# Patient Record
Sex: FEMALE | Race: WHITE | NOT HISPANIC OR LATINO | Employment: FULL TIME | URBAN - METROPOLITAN AREA
[De-identification: names, ages, dates, MRNs, and addresses within clinical notes are randomized per-mention and may not be internally consistent; named-entity substitution may affect disease eponyms.]

---

## 2021-03-29 ENCOUNTER — TELEPHONE (OUTPATIENT)
Dept: NEUROLOGY | Facility: CLINIC | Age: 26
End: 2021-03-29

## 2021-07-07 ENCOUNTER — OFFICE VISIT (OUTPATIENT)
Dept: NEUROLOGY | Facility: CLINIC | Age: 26
End: 2021-07-07
Payer: COMMERCIAL

## 2021-07-07 VITALS
WEIGHT: 198.88 LBS | HEART RATE: 77 BPM | DIASTOLIC BLOOD PRESSURE: 77 MMHG | SYSTOLIC BLOOD PRESSURE: 113 MMHG | BODY MASS INDEX: 33.95 KG/M2 | HEIGHT: 64 IN

## 2021-07-07 DIAGNOSIS — G43.119 INTRACTABLE MIGRAINE WITH AURA WITHOUT STATUS MIGRAINOSUS: Primary | ICD-10-CM

## 2021-07-07 PROCEDURE — 99204 OFFICE O/P NEW MOD 45 MIN: CPT | Mod: S$GLB,,, | Performed by: NEUROLOGICAL SURGERY

## 2021-07-07 PROCEDURE — 3008F PR BODY MASS INDEX (BMI) DOCUMENTED: ICD-10-PCS | Mod: CPTII,S$GLB,, | Performed by: NEUROLOGICAL SURGERY

## 2021-07-07 PROCEDURE — 99204 PR OFFICE/OUTPT VISIT, NEW, LEVL IV, 45-59 MIN: ICD-10-PCS | Mod: S$GLB,,, | Performed by: NEUROLOGICAL SURGERY

## 2021-07-07 PROCEDURE — 99999 PR PBB SHADOW E&M-EST. PATIENT-LVL III: ICD-10-PCS | Mod: PBBFAC,,, | Performed by: NEUROLOGICAL SURGERY

## 2021-07-07 PROCEDURE — 1126F PR PAIN SEVERITY QUANTIFIED, NO PAIN PRESENT: ICD-10-PCS | Mod: S$GLB,,, | Performed by: NEUROLOGICAL SURGERY

## 2021-07-07 PROCEDURE — 99999 PR PBB SHADOW E&M-EST. PATIENT-LVL III: CPT | Mod: PBBFAC,,, | Performed by: NEUROLOGICAL SURGERY

## 2021-07-07 PROCEDURE — 3008F BODY MASS INDEX DOCD: CPT | Mod: CPTII,S$GLB,, | Performed by: NEUROLOGICAL SURGERY

## 2021-07-07 PROCEDURE — 1126F AMNT PAIN NOTED NONE PRSNT: CPT | Mod: S$GLB,,, | Performed by: NEUROLOGICAL SURGERY

## 2021-07-07 RX ORDER — GABAPENTIN 300 MG/1
600 CAPSULE ORAL 3 TIMES DAILY
Qty: 180 CAPSULE | Refills: 11 | Status: SHIPPED | OUTPATIENT
Start: 2021-07-07 | End: 2022-07-07

## 2021-07-07 RX ORDER — ERENUMAB-AOOE 70 MG/ML
70 INJECTION SUBCUTANEOUS
Qty: 1 ML | Refills: 5 | Status: SHIPPED | OUTPATIENT
Start: 2021-07-07 | End: 2021-07-30

## 2021-07-07 RX ORDER — NORTRIPTYLINE HYDROCHLORIDE 50 MG/1
100 CAPSULE ORAL NIGHTLY
Qty: 60 CAPSULE | Refills: 5 | Status: SHIPPED | OUTPATIENT
Start: 2021-07-07 | End: 2021-12-09 | Stop reason: ALTCHOICE

## 2021-07-07 RX ORDER — NORTRIPTYLINE HYDROCHLORIDE 75 MG/1
75 CAPSULE ORAL DAILY
COMMUNITY
Start: 2021-03-26 | End: 2021-07-07

## 2021-07-07 RX ORDER — ACETAMINOPHEN AND CODEINE PHOSPHATE 120; 12 MG/5ML; MG/5ML
SOLUTION ORAL
COMMUNITY
Start: 2021-03-26

## 2021-07-07 RX ORDER — GABAPENTIN 300 MG/1
600 CAPSULE ORAL 3 TIMES DAILY
COMMUNITY
Start: 2021-05-30 | End: 2021-07-07 | Stop reason: SDUPTHER

## 2021-08-05 ENCOUNTER — TELEPHONE (OUTPATIENT)
Dept: NEUROLOGY | Facility: CLINIC | Age: 26
End: 2021-08-05

## 2021-09-01 ENCOUNTER — NURSE TRIAGE (OUTPATIENT)
Dept: ADMINISTRATIVE | Facility: CLINIC | Age: 26
End: 2021-09-01

## 2021-09-01 ENCOUNTER — TELEPHONE (OUTPATIENT)
Dept: NEUROLOGY | Facility: HOSPITAL | Age: 26
End: 2021-09-01

## 2021-09-27 ENCOUNTER — OFFICE VISIT (OUTPATIENT)
Dept: URGENT CARE | Facility: CLINIC | Age: 26
End: 2021-09-27
Payer: COMMERCIAL

## 2021-09-27 VITALS
TEMPERATURE: 98 F | BODY MASS INDEX: 32.27 KG/M2 | WEIGHT: 189 LBS | OXYGEN SATURATION: 100 % | SYSTOLIC BLOOD PRESSURE: 121 MMHG | RESPIRATION RATE: 14 BRPM | HEIGHT: 64 IN | HEART RATE: 61 BPM | DIASTOLIC BLOOD PRESSURE: 78 MMHG

## 2021-09-27 DIAGNOSIS — N39.0 URINARY TRACT INFECTION WITHOUT HEMATURIA, SITE UNSPECIFIED: Primary | ICD-10-CM

## 2021-09-27 LAB
B-HCG UR QL: NEGATIVE
BILIRUB UR QL STRIP: NEGATIVE
CTP QC/QA: YES
GLUCOSE UR QL STRIP: NEGATIVE
KETONES UR QL STRIP: NEGATIVE
LEUKOCYTE ESTERASE UR QL STRIP: POSITIVE
PH, POC UA: 5 (ref 5–8)
POC BLOOD, URINE: NEGATIVE
POC NITRATES, URINE: NEGATIVE
PROT UR QL STRIP: NEGATIVE
SP GR UR STRIP: 1.01 (ref 1–1.03)
UROBILINOGEN UR STRIP-ACNC: NORMAL (ref 0.1–1.1)

## 2021-09-27 PROCEDURE — 81003 URINALYSIS AUTO W/O SCOPE: CPT | Mod: QW,S$GLB,, | Performed by: PHYSICIAN ASSISTANT

## 2021-09-27 PROCEDURE — 3078F PR MOST RECENT DIASTOLIC BLOOD PRESSURE < 80 MM HG: ICD-10-PCS | Mod: CPTII,S$GLB,, | Performed by: PHYSICIAN ASSISTANT

## 2021-09-27 PROCEDURE — 1160F PR REVIEW ALL MEDS BY PRESCRIBER/CLIN PHARMACIST DOCUMENTED: ICD-10-PCS | Mod: CPTII,S$GLB,, | Performed by: PHYSICIAN ASSISTANT

## 2021-09-27 PROCEDURE — 99204 PR OFFICE/OUTPT VISIT, NEW, LEVL IV, 45-59 MIN: ICD-10-PCS | Mod: 25,S$GLB,, | Performed by: PHYSICIAN ASSISTANT

## 2021-09-27 PROCEDURE — 99204 OFFICE O/P NEW MOD 45 MIN: CPT | Mod: 25,S$GLB,, | Performed by: PHYSICIAN ASSISTANT

## 2021-09-27 PROCEDURE — 1159F PR MEDICATION LIST DOCUMENTED IN MEDICAL RECORD: ICD-10-PCS | Mod: CPTII,S$GLB,, | Performed by: PHYSICIAN ASSISTANT

## 2021-09-27 PROCEDURE — 3074F SYST BP LT 130 MM HG: CPT | Mod: CPTII,S$GLB,, | Performed by: PHYSICIAN ASSISTANT

## 2021-09-27 PROCEDURE — 3078F DIAST BP <80 MM HG: CPT | Mod: CPTII,S$GLB,, | Performed by: PHYSICIAN ASSISTANT

## 2021-09-27 PROCEDURE — 3074F PR MOST RECENT SYSTOLIC BLOOD PRESSURE < 130 MM HG: ICD-10-PCS | Mod: CPTII,S$GLB,, | Performed by: PHYSICIAN ASSISTANT

## 2021-09-27 PROCEDURE — 3008F BODY MASS INDEX DOCD: CPT | Mod: CPTII,S$GLB,, | Performed by: PHYSICIAN ASSISTANT

## 2021-09-27 PROCEDURE — 81025 URINE PREGNANCY TEST: CPT | Mod: S$GLB,,, | Performed by: PHYSICIAN ASSISTANT

## 2021-09-27 PROCEDURE — 1159F MED LIST DOCD IN RCRD: CPT | Mod: CPTII,S$GLB,, | Performed by: PHYSICIAN ASSISTANT

## 2021-09-27 PROCEDURE — 1160F RVW MEDS BY RX/DR IN RCRD: CPT | Mod: CPTII,S$GLB,, | Performed by: PHYSICIAN ASSISTANT

## 2021-09-27 PROCEDURE — 81025 POCT URINE PREGNANCY: ICD-10-PCS | Mod: S$GLB,,, | Performed by: PHYSICIAN ASSISTANT

## 2021-09-27 PROCEDURE — 3008F PR BODY MASS INDEX (BMI) DOCUMENTED: ICD-10-PCS | Mod: CPTII,S$GLB,, | Performed by: PHYSICIAN ASSISTANT

## 2021-09-27 PROCEDURE — 81003 POCT URINALYSIS, DIPSTICK, AUTOMATED, W/O SCOPE: ICD-10-PCS | Mod: QW,S$GLB,, | Performed by: PHYSICIAN ASSISTANT

## 2021-09-27 RX ORDER — SULFAMETHOXAZOLE AND TRIMETHOPRIM 800; 160 MG/1; MG/1
1 TABLET ORAL 2 TIMES DAILY
Qty: 10 TABLET | Refills: 0 | Status: SHIPPED | OUTPATIENT
Start: 2021-09-27 | End: 2021-10-02

## 2021-09-27 RX ORDER — PHENAZOPYRIDINE HYDROCHLORIDE 100 MG/1
100 TABLET, FILM COATED ORAL 3 TIMES DAILY PRN
Qty: 9 TABLET | Refills: 0 | Status: SHIPPED | OUTPATIENT
Start: 2021-09-27 | End: 2021-09-30

## 2021-10-14 ENCOUNTER — OFFICE VISIT (OUTPATIENT)
Dept: NEUROLOGY | Facility: CLINIC | Age: 26
End: 2021-10-14
Payer: COMMERCIAL

## 2021-10-14 ENCOUNTER — NURSE TRIAGE (OUTPATIENT)
Dept: ADMINISTRATIVE | Facility: CLINIC | Age: 26
End: 2021-10-14

## 2021-10-14 ENCOUNTER — TELEPHONE (OUTPATIENT)
Dept: NEUROLOGY | Facility: CLINIC | Age: 26
End: 2021-10-14

## 2021-10-14 VITALS
SYSTOLIC BLOOD PRESSURE: 124 MMHG | HEART RATE: 84 BPM | BODY MASS INDEX: 34.44 KG/M2 | WEIGHT: 201.75 LBS | DIASTOLIC BLOOD PRESSURE: 78 MMHG | HEIGHT: 64 IN

## 2021-10-14 DIAGNOSIS — G43.119 INTRACTABLE MIGRAINE WITH AURA WITHOUT STATUS MIGRAINOSUS: Primary | ICD-10-CM

## 2021-10-14 PROCEDURE — 99999 PR PBB SHADOW E&M-EST. PATIENT-LVL III: CPT | Mod: PBBFAC,,, | Performed by: NEUROLOGICAL SURGERY

## 2021-10-14 PROCEDURE — 3078F DIAST BP <80 MM HG: CPT | Mod: CPTII,S$GLB,, | Performed by: NEUROLOGICAL SURGERY

## 2021-10-14 PROCEDURE — 3078F PR MOST RECENT DIASTOLIC BLOOD PRESSURE < 80 MM HG: ICD-10-PCS | Mod: CPTII,S$GLB,, | Performed by: NEUROLOGICAL SURGERY

## 2021-10-14 PROCEDURE — 3074F PR MOST RECENT SYSTOLIC BLOOD PRESSURE < 130 MM HG: ICD-10-PCS | Mod: CPTII,S$GLB,, | Performed by: NEUROLOGICAL SURGERY

## 2021-10-14 PROCEDURE — 3008F PR BODY MASS INDEX (BMI) DOCUMENTED: ICD-10-PCS | Mod: CPTII,S$GLB,, | Performed by: NEUROLOGICAL SURGERY

## 2021-10-14 PROCEDURE — 99213 PR OFFICE/OUTPT VISIT, EST, LEVL III, 20-29 MIN: ICD-10-PCS | Mod: S$GLB,,, | Performed by: NEUROLOGICAL SURGERY

## 2021-10-14 PROCEDURE — 3008F BODY MASS INDEX DOCD: CPT | Mod: CPTII,S$GLB,, | Performed by: NEUROLOGICAL SURGERY

## 2021-10-14 PROCEDURE — 99999 PR PBB SHADOW E&M-EST. PATIENT-LVL III: ICD-10-PCS | Mod: PBBFAC,,, | Performed by: NEUROLOGICAL SURGERY

## 2021-10-14 PROCEDURE — 1159F PR MEDICATION LIST DOCUMENTED IN MEDICAL RECORD: ICD-10-PCS | Mod: CPTII,S$GLB,, | Performed by: NEUROLOGICAL SURGERY

## 2021-10-14 PROCEDURE — 3074F SYST BP LT 130 MM HG: CPT | Mod: CPTII,S$GLB,, | Performed by: NEUROLOGICAL SURGERY

## 2021-10-14 PROCEDURE — 99213 OFFICE O/P EST LOW 20 MIN: CPT | Mod: S$GLB,,, | Performed by: NEUROLOGICAL SURGERY

## 2021-10-14 PROCEDURE — 1159F MED LIST DOCD IN RCRD: CPT | Mod: CPTII,S$GLB,, | Performed by: NEUROLOGICAL SURGERY

## 2021-11-22 ENCOUNTER — OFFICE VISIT (OUTPATIENT)
Dept: PSYCHIATRY | Facility: CLINIC | Age: 26
End: 2021-11-22
Payer: COMMERCIAL

## 2021-11-22 DIAGNOSIS — F43.23 ADJUSTMENT DISORDER WITH MIXED ANXIETY AND DEPRESSED MOOD: Primary | ICD-10-CM

## 2021-11-22 PROCEDURE — 90791 PR PSYCHIATRIC DIAGNOSTIC EVALUATION: ICD-10-PCS | Mod: S$GLB,,, | Performed by: SOCIAL WORKER

## 2021-11-22 PROCEDURE — 90791 PSYCH DIAGNOSTIC EVALUATION: CPT | Mod: S$GLB,,, | Performed by: SOCIAL WORKER

## 2021-11-30 ENCOUNTER — OFFICE VISIT (OUTPATIENT)
Dept: PSYCHIATRY | Facility: CLINIC | Age: 26
End: 2021-11-30
Payer: COMMERCIAL

## 2021-11-30 DIAGNOSIS — F32.1 CURRENT MODERATE EPISODE OF MAJOR DEPRESSIVE DISORDER, UNSPECIFIED WHETHER RECURRENT: Primary | ICD-10-CM

## 2021-11-30 PROCEDURE — 90834 PR PSYCHOTHERAPY W/PATIENT, 45 MIN: ICD-10-PCS | Mod: S$GLB,,, | Performed by: SOCIAL WORKER

## 2021-11-30 PROCEDURE — 90834 PSYTX W PT 45 MINUTES: CPT | Mod: S$GLB,,, | Performed by: SOCIAL WORKER

## 2021-12-09 ENCOUNTER — OFFICE VISIT (OUTPATIENT)
Dept: PSYCHIATRY | Facility: CLINIC | Age: 26
End: 2021-12-09
Payer: COMMERCIAL

## 2021-12-09 VITALS
WEIGHT: 204.06 LBS | BODY MASS INDEX: 34.84 KG/M2 | HEIGHT: 64 IN | DIASTOLIC BLOOD PRESSURE: 70 MMHG | HEART RATE: 80 BPM | SYSTOLIC BLOOD PRESSURE: 133 MMHG

## 2021-12-09 DIAGNOSIS — F41.1 GENERALIZED ANXIETY DISORDER: ICD-10-CM

## 2021-12-09 DIAGNOSIS — F32.1 CURRENT MODERATE EPISODE OF MAJOR DEPRESSIVE DISORDER, UNSPECIFIED WHETHER RECURRENT: Primary | ICD-10-CM

## 2021-12-09 DIAGNOSIS — F33.2 SEVERE EPISODE OF RECURRENT MAJOR DEPRESSIVE DISORDER, WITHOUT PSYCHOTIC FEATURES: Primary | ICD-10-CM

## 2021-12-09 PROCEDURE — 90834 PSYTX W PT 45 MINUTES: CPT | Mod: S$GLB,,, | Performed by: SOCIAL WORKER

## 2021-12-09 PROCEDURE — 99999 PR PBB SHADOW E&M-EST. PATIENT-LVL II: CPT | Mod: PBBFAC,,, | Performed by: STUDENT IN AN ORGANIZED HEALTH CARE EDUCATION/TRAINING PROGRAM

## 2021-12-09 PROCEDURE — 90834 PR PSYCHOTHERAPY W/PATIENT, 45 MIN: ICD-10-PCS | Mod: S$GLB,,, | Performed by: SOCIAL WORKER

## 2021-12-09 PROCEDURE — 99999 PR PBB SHADOW E&M-EST. PATIENT-LVL II: ICD-10-PCS | Mod: PBBFAC,,, | Performed by: STUDENT IN AN ORGANIZED HEALTH CARE EDUCATION/TRAINING PROGRAM

## 2021-12-09 PROCEDURE — 99205 PR OFFICE/OUTPT VISIT, NEW, LEVL V, 60-74 MIN: ICD-10-PCS | Mod: S$GLB,,, | Performed by: STUDENT IN AN ORGANIZED HEALTH CARE EDUCATION/TRAINING PROGRAM

## 2021-12-09 PROCEDURE — 99205 OFFICE O/P NEW HI 60 MIN: CPT | Mod: S$GLB,,, | Performed by: STUDENT IN AN ORGANIZED HEALTH CARE EDUCATION/TRAINING PROGRAM

## 2021-12-09 RX ORDER — ESCITALOPRAM OXALATE 10 MG/1
10 TABLET ORAL DAILY
Qty: 30 TABLET | Refills: 0 | Status: SHIPPED | OUTPATIENT
Start: 2021-12-09 | End: 2022-01-06 | Stop reason: SDUPTHER

## 2021-12-10 PROBLEM — F32.A DEPRESSION: Status: ACTIVE | Noted: 2021-12-10

## 2021-12-22 ENCOUNTER — OFFICE VISIT (OUTPATIENT)
Dept: PSYCHIATRY | Facility: CLINIC | Age: 26
End: 2021-12-22
Payer: COMMERCIAL

## 2021-12-22 DIAGNOSIS — F32.1 CURRENT MODERATE EPISODE OF MAJOR DEPRESSIVE DISORDER, UNSPECIFIED WHETHER RECURRENT: Primary | ICD-10-CM

## 2021-12-22 PROCEDURE — 90834 PR PSYCHOTHERAPY W/PATIENT, 45 MIN: ICD-10-PCS | Mod: S$GLB,,, | Performed by: SOCIAL WORKER

## 2021-12-22 PROCEDURE — 90834 PSYTX W PT 45 MINUTES: CPT | Mod: S$GLB,,, | Performed by: SOCIAL WORKER

## 2021-12-28 ENCOUNTER — PATIENT MESSAGE (OUTPATIENT)
Dept: PSYCHIATRY | Facility: CLINIC | Age: 26
End: 2021-12-28
Payer: COMMERCIAL

## 2021-12-28 ENCOUNTER — OFFICE VISIT (OUTPATIENT)
Dept: PSYCHIATRY | Facility: CLINIC | Age: 26
End: 2021-12-28
Payer: COMMERCIAL

## 2021-12-28 DIAGNOSIS — F32.1 CURRENT MODERATE EPISODE OF MAJOR DEPRESSIVE DISORDER, UNSPECIFIED WHETHER RECURRENT: Primary | ICD-10-CM

## 2021-12-28 PROCEDURE — 90834 PSYTX W PT 45 MINUTES: CPT | Mod: 95,,, | Performed by: SOCIAL WORKER

## 2021-12-28 PROCEDURE — 90834 PR PSYCHOTHERAPY W/PATIENT, 45 MIN: ICD-10-PCS | Mod: 95,,, | Performed by: SOCIAL WORKER

## 2022-01-04 ENCOUNTER — OFFICE VISIT (OUTPATIENT)
Dept: PSYCHIATRY | Facility: CLINIC | Age: 27
End: 2022-01-04
Payer: COMMERCIAL

## 2022-01-04 DIAGNOSIS — F32.1 CURRENT MODERATE EPISODE OF MAJOR DEPRESSIVE DISORDER, UNSPECIFIED WHETHER RECURRENT: Primary | ICD-10-CM

## 2022-01-04 PROCEDURE — 90834 PR PSYCHOTHERAPY W/PATIENT, 45 MIN: ICD-10-PCS | Mod: 95,,, | Performed by: SOCIAL WORKER

## 2022-01-04 PROCEDURE — 90834 PSYTX W PT 45 MINUTES: CPT | Mod: 95,,, | Performed by: SOCIAL WORKER

## 2022-01-04 NOTE — PROGRESS NOTES
"Individual Psychotherapy (PhD/LCSW)    1/4/2022    The patient location is: [place]  The chief complaint leading to consultation is: depression and anxiety    Visit type: audiovisual    Face to Face time with patient: 45 minutes  60 minutes of total time spent on the encounter, which includes face to face time and non-face to face time preparing to see the patient (eg, review of tests), Obtaining and/or reviewing separately obtained history, Documenting clinical information in the electronic or other health record, Independently interpreting results (not separately reported) and communicating results to the patient/family/caregiver, or Care coordination (not separately reported).         Each patient to whom he or she provides medical services by telemedicine is:  (1) informed of the relationship between the physician and patient and the respective role of any other health care provider with respect to management of the patient; and (2) notified that he or she may decline to receive medical services by telemedicine and may withdraw from such care at any time.    Site:  [facility]         Therapeutic Intervention: Met with patient.  Outpatient - Insight oriented psychotherapy 45 min - CPT code 65281, Outpatient - Behavior modifying psychotherapy 45 min - CPT code 31036 and Outpatient - Supportive psychotherapy 45 min - CPT Code 84161    Chief complaint/reason for encounter: depression     Interval history and content of current session: Pt is a 26 year old single white female who presents today with continued symptoms of depression in the context of multiple stressors related to work, school, and social life.     Pt presents today from her apartment. Pt appears AAOx4, pleasant but tired. Pt continues to report that she feels "exhausted" and states that she is unsure the Lexapro has helped with her mood. Pt plans on addressing in her psychiatry appt which will occur on 1/6. Pt reports that she was able to " attend her [employer] work out sessions and states that she does notice a little difference in mood after she completes the appointments. Pt reports that she was able to get out with friends this past Thurs and Fri but slept poorly last night. Pt focused rest of session on her relationship with her long-distance boyfriend Geovanny stating that she wants to communicate her needs but is fearful that he may not want to be in the relationship if she says anything. Pt reports that she and Geovanny were best friends for a few years before dating. Pt states that she was in one other serious relationship in 2019 for 6 months with a man from [place] while pt was in [employer] in [facility]. Pt reports that at the 6 month luis enrique, she asked if they could clarify the definition of their relationship, and pt states that after the conversation, she never heard from him again. Pt fearful of rejection this time as well. Clinician provided support around pt's fear and utilized reality testing (just because it happens in one relationship doesn't dictate that it will happen in the next relationship). Pt reports that she plans on having a conversation with boyfriend when he visits in a few weeks. Pt to focus on self care this week. Clinician gave patient homework assignment to visit [employer] [facility] one day this week.     Treatment plan:  · Target symptoms: depression, anxiety , work stress  · Why chosen therapy is appropriate versus another modality: relevant to diagnosis, patient responds to this modality, evidence based practice  · Outcome monitoring methods: self-report, observation  · Therapeutic intervention type: insight oriented psychotherapy, behavior modifying psychotherapy, supportive psychotherapy    Risk parameters:  Patient reports suicidal ideation: passive SI, denies active plan  Patient reports no homicidal ideation  Patient reports no self-injurious behavior  Patient reports no violent behavior    Verbal  deficits: None    Patient's response to intervention:  The patient's response to intervention is accepting.    Progress toward goals and other mental status changes:  The patient's progress toward goals is excellent.    Diagnosis:     ICD-10-CM ICD-9-CM   1. Current moderate episode of major depressive disorder, unspecified whether recurrent  F32.1 296.22       Plan:  individual psychotherapy and consult psychiatrist for medication evaluation    Return to clinic: 1 week    Length of Service (minutes): 45

## 2022-01-06 ENCOUNTER — OFFICE VISIT (OUTPATIENT)
Dept: PSYCHIATRY | Facility: CLINIC | Age: 27
End: 2022-01-06
Payer: COMMERCIAL

## 2022-01-06 VITALS
WEIGHT: 207.44 LBS | HEART RATE: 59 BPM | BODY MASS INDEX: 35.61 KG/M2 | DIASTOLIC BLOOD PRESSURE: 78 MMHG | SYSTOLIC BLOOD PRESSURE: 125 MMHG

## 2022-01-06 DIAGNOSIS — F33.2 SEVERE EPISODE OF RECURRENT MAJOR DEPRESSIVE DISORDER, WITHOUT PSYCHOTIC FEATURES: Primary | ICD-10-CM

## 2022-01-06 DIAGNOSIS — F41.1 GENERALIZED ANXIETY DISORDER: ICD-10-CM

## 2022-01-06 PROCEDURE — 99999 PR PBB SHADOW E&M-EST. PATIENT-LVL II: CPT | Mod: PBBFAC,,, | Performed by: STUDENT IN AN ORGANIZED HEALTH CARE EDUCATION/TRAINING PROGRAM

## 2022-01-06 PROCEDURE — 99214 PR OFFICE/OUTPT VISIT, EST, LEVL IV, 30-39 MIN: ICD-10-PCS | Mod: S$GLB,,, | Performed by: STUDENT IN AN ORGANIZED HEALTH CARE EDUCATION/TRAINING PROGRAM

## 2022-01-06 PROCEDURE — 99214 OFFICE O/P EST MOD 30 MIN: CPT | Mod: S$GLB,,, | Performed by: STUDENT IN AN ORGANIZED HEALTH CARE EDUCATION/TRAINING PROGRAM

## 2022-01-06 PROCEDURE — 99999 PR PBB SHADOW E&M-EST. PATIENT-LVL II: ICD-10-PCS | Mod: PBBFAC,,, | Performed by: STUDENT IN AN ORGANIZED HEALTH CARE EDUCATION/TRAINING PROGRAM

## 2022-01-06 RX ORDER — ESCITALOPRAM OXALATE 10 MG/1
10 TABLET ORAL DAILY
Qty: 30 TABLET | Refills: 0 | Status: SHIPPED | OUTPATIENT
Start: 2022-01-06 | End: 2022-01-27

## 2022-01-06 NOTE — PROGRESS NOTES
OUTPATIENT PSYCHIATRY FOLLOW-UP VISIT    ENCOUNTER DATE:  1/6/2022  SITE:  [facility]      CHIEF COMPLAINT:   Depression    HISTORY OF PRESENTING ILLNESS:  Sarahy Moreira is a 26 y.o. female with history of PTSD who presents for follow up.       History as told by Patient - & or family/friend/spouse/caregiver with pts permission  Pt reports doing OK, has most of the same symptoms as on initial visit, denies significant improvement thus far. Taking lexapro 10mg, discontinued notriptyline. Has noticed more headaches since switching off of nortriptyline, usually ~once a week, now ~couple times a week. Endorses low mood, low energy. Trouble sleeping, up to 3 hours for sleep onset. Still working but finds it difficult to do so, otherwise not doing much else around the house or on personal time. Has occasional passive thoughts of not wanting to be alive, do not last long, no active thoughts or plans.  When first started taking lexapro found herself more 'touchy' or easily upset, however has stabilized after 1-2 weeks. Denies onset of manic symptoms, decreased need for sleep, significant irritability or other.  Seeing therapist weekly, endorses benefit.         PSYCHIATRIC REVIEW OF SYSTEMS:(none/ yes- better/worse/stable/& what symptoms)    Symptoms of Depression:  Still present    Symptoms of Anxiety/ panic attacks: no improvement    Symptoms of Emely/Hypomania:  None at this time    Symptoms of psychosis:  none    Sleep:   Up to 3 hours sleep onset    Appetite:  low    Alcohol:  1-2x per week    Illicit Drugs:  Use of Marijuana to wind down from work, used to help with sleep.     Psychosocial stressors:  Work, relationships    Risk Parameters:  Patient reports no suicidal ideation  Patient reports no homicidal ideation  Patient reports no self-injurious behavior  Patient reports no violent behavior    PSYCHIATRIC MED REVIEW  Nortriptyline, gabapentin  Hx effexor    Current psych  meds  Medication side effects:  none  Medication compliance:  yes    Previous psych meds trials  Notriptyline, gabapentin. Hx effexo    MEDICAL REVIEW OF SYSTEMS:  Complete review of systems performed covering Constitutional, Cardiovascular, Respiratory, Gastrointestinal, Musculoskeletal, Skin, Neurologic and Endocrine  All systems negative/ except for  Headaches.     MEDICAL HISTORY:  History reviewed. No pertinent past medical history.    ALL MEDICATIONS:    Current Outpatient Medications:     AIMOVIG AUTOINJECTOR 70 mg/mL autoinjector, ADMINISTER 1 ML(70 MG) UNDER THE SKIN EVERY 28 DAYS, Disp: 1 mL, Rfl: 5    EScitalopram oxalate (LEXAPRO) 10 MG tablet, Take 1 tablet (10 mg total) by mouth once daily., Disp: 30 tablet, Rfl: 0    gabapentin (NEURONTIN) 300 MG capsule, Take 2 capsules (600 mg total) by mouth 3 (three) times daily., Disp: 180 capsule, Rfl: 11    norethindrone (MICRONOR) 0.35 mg tablet, , Disp: , Rfl:     ALLERGIES:  Review of patient's allergies indicates:  No Known Allergies    RELEVANT LABS/STUDIES:    No results found for: WBC, HGB, HCT, MCV, PLT  BMP  No results found for: NA, K, CL, CO2, BUN, CREATININE, CALCIUM, ANIONGAP, ESTGFRAFRICA, EGFRNONAA  No results found for: ALT, AST, GGT, ALKPHOS, BILITOT  No results found for: TSH  No results found for: LABA1C, HGBA1C      VITALS  Vitals:    01/06/22 0805   BP: 125/78   Pulse: (!) 59   Weight: 94.1 kg (207 lb 7.3 oz)       PHYSICAL EXAM  General: well developed, well nourished  Neurologic:   Gait: Normal   Psychomotor signs:  No involuntary movements or tremor  AIMS: none    PSYCHIATRIC EXAM:    Mental Status Exam:  Appearance: unremarkable, age appropriate  Behavior/Cooperation: limited/ appropriate normal, cooperative  Speech:  normal tone, normal rate, normal pitch, normal volume  Language: uses words appropriately; NO aphasia or dysarthria  Mood: depressed  Affect:  constricted, otherwise reactive, appropriate  Thought Process: normal and  logical  Thought Content: normal, no suicidality, no homicidality, delusions, or paranoia  Level of Consciousness: Alert and Oriented x3  Memory:  Intact  Attention/concentration: appropriate for age/education.   Fund of Knowledge: appears adequate  Insight:  Intact  Judgment: Intact       IMPRESSION:    Sarahy Moreira is a 26 y.o. female with history of PTSD who presents for follow up. Symptoms of MDD and MONCHO.   Unclear if previously has had true hypomanic episodes or if variation of normal and/or fueled by caffeine, cannabis use. Discussed diagnoses with pt, agrees to plan for SSRI trial, monitoring response and manic symptoms, and to call if experiencing manic symptoms/episode.     DIAGNOSES:    ICD-10-CM ICD-9-CM   1. Severe episode of recurrent major depressive disorder, without psychotic features  F33.2 296.33   2. Generalized anxiety disorder  F41.1 300.02     Rule out Bipolar 2 disorder, mre depression    PLAN:  Psych Med:   Continue lexapro 10mg. Consider increasing on next visit.    Discontinue nortriptyline, see neurologist for treatment of migraines.      Discussed with patient informed consent, risks vs. benefits, alternative treatments, side effect profile and the inherent unpredictability of individual responses to these treatments. Answered any questions patient may have had. The patient expresses understanding of the above and displays the capacity to agree with this current plan     Other:     RETURN TO CLINIC: 4 weeks    Nicholas Molina MD  [facility], PGY-3  01/06/2022

## 2022-01-11 ENCOUNTER — OFFICE VISIT (OUTPATIENT)
Dept: PSYCHIATRY | Facility: CLINIC | Age: 27
End: 2022-01-11
Payer: COMMERCIAL

## 2022-01-11 DIAGNOSIS — F41.1 GENERALIZED ANXIETY DISORDER: ICD-10-CM

## 2022-01-11 DIAGNOSIS — F33.2 SEVERE EPISODE OF RECURRENT MAJOR DEPRESSIVE DISORDER, WITHOUT PSYCHOTIC FEATURES: Primary | ICD-10-CM

## 2022-01-11 PROCEDURE — 90834 PR PSYCHOTHERAPY W/PATIENT, 45 MIN: ICD-10-PCS | Mod: 95,,, | Performed by: SOCIAL WORKER

## 2022-01-11 PROCEDURE — 90834 PSYTX W PT 45 MINUTES: CPT | Mod: 95,,, | Performed by: SOCIAL WORKER

## 2022-01-11 NOTE — PROGRESS NOTES
Individual Psychotherapy (PhD/LCSW)    1/11/2022    The patient location is: [place]  The chief complaint leading to consultation is: depression and anxiety    Visit type: audiovisual    Face to Face time with patient: 45 minutes  60 minutes of total time spent on the encounter, which includes face to face time and non-face to face time preparing to see the patient (eg, review of tests), Obtaining and/or reviewing separately obtained history, Documenting clinical information in the electronic or other health record, Independently interpreting results (not separately reported) and communicating results to the patient/family/caregiver, or Care coordination (not separately reported).         Each patient to whom he or she provides medical services by telemedicine is:  (1) informed of the relationship between the physician and patient and the respective role of any other health care provider with respect to management of the patient; and (2) notified that he or she may decline to receive medical services by telemedicine and may withdraw from such care at any time.    Site:  [facility]         Therapeutic Intervention: Met with patient.  Outpatient - Insight oriented psychotherapy 45 min - CPT code 94670, Outpatient - Behavior modifying psychotherapy 45 min - CPT code 22011 and Outpatient - Supportive psychotherapy 45 min - CPT Code 90575    Chief complaint/reason for encounter: depression     Interval history and content of current session: Pt is a 26 year old single white female who presents today with continued symptoms of depression in the context of multiple stressors related to work, school, and social life.     Pt presents today from her apartment. Pt appears AAOx4, pleasant and more awake. Pt's session was at a later time this week than previous weeks. Pt appeared more well-groomed with hair brushed. Pt reports that she recently got a haircut. Pt states that she was able to go to a gym session this  morning and reports that she always feels better on days when she goes to the gym. Pt reports an issue yesterday at work where she sensed that one of the owners was accusing her of stealing due to an $85 shortage from yesterday. Pt reports that owner never stated that pt stole, however pt insinuated this from his tone of voice. Pt states that she was able to track down what happened, and reports that the the incident leading to the shortage took place during a time when she was not at work. 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 Pt tearful at times due to feeling angry and frustrated surrounding the thought that her boss did not believe her. Clinician pointed out to pt that based on pt's report, her boss never stated that pt stole. Pt and clinician did some reality testing around the original negative thought which pt reports was somewhat helpful. Clinician recommending that pt have a conversation with her boss at some point in the future when things settle down as pt reports that he often seems frustrated with pt. Pt also reports that she had a frustrating conversation with her boyfriend with whom she is visiting this weekend in [place]. She reports that he made a comment that he has no plans to ever visit [place]. Pt's parents live in [place], and pt reports that she felt hurt by his comment, reporting concerns that he is not taking their relationship seriously. Pt states that she addressed her feelings with boyfriend who she reports does want to continue the relationship and plans on having more conversations when they are with each other in person. Pt to follow up in two weeks as boyfriend will be with her next week in [place] and pt will be taking an intensive one-week graduate school course.    Treatment plan:  · Target symptoms: depression, anxiety , work stress  · Why chosen therapy is appropriate  versus another modality: relevant to diagnosis, patient responds to this modality, evidence based practice  · Outcome monitoring methods: self-report, observation  · Therapeutic intervention type: insight oriented psychotherapy, behavior modifying psychotherapy, supportive psychotherapy    Risk parameters:  Patient reports suicidal ideation: passive SI, denies active plan  Patient reports no homicidal ideation  Patient reports no self-injurious behavior  Patient reports no violent behavior    Verbal deficits: None    Patient's response to intervention:  The patient's response to intervention is accepting.    Progress toward goals and other mental status changes:  The patient's progress toward goals is excellent.    Diagnosis:     ICD-10-CM ICD-9-CM   1. Severe episode of recurrent major depressive disorder, without psychotic features  F33.2 296.33   2. Generalized anxiety disorder  F41.1 300.02       Plan:  individual psychotherapy and consult psychiatrist for medication evaluation    Return to clinic: 1 week    Length of Service (minutes): 45

## 2022-01-23 ENCOUNTER — PATIENT MESSAGE (OUTPATIENT)
Dept: PSYCHIATRY | Facility: CLINIC | Age: 27
End: 2022-01-23
Payer: COMMERCIAL

## 2022-01-24 ENCOUNTER — PATIENT MESSAGE (OUTPATIENT)
Dept: PSYCHIATRY | Facility: CLINIC | Age: 27
End: 2022-01-24
Payer: COMMERCIAL

## 2022-01-24 NOTE — PROGRESS NOTES
STAFF COMMENTS: I have discussed pt with Dr. Molina and reviewed the history and exam. I agree and concur with the assessment and plan.

## 2022-01-27 ENCOUNTER — OFFICE VISIT (OUTPATIENT)
Dept: PSYCHIATRY | Facility: CLINIC | Age: 27
End: 2022-01-27
Payer: COMMERCIAL

## 2022-01-27 VITALS
WEIGHT: 213.38 LBS | DIASTOLIC BLOOD PRESSURE: 68 MMHG | SYSTOLIC BLOOD PRESSURE: 114 MMHG | BODY MASS INDEX: 36.63 KG/M2 | HEART RATE: 69 BPM

## 2022-01-27 DIAGNOSIS — F32.1 CURRENT MODERATE EPISODE OF MAJOR DEPRESSIVE DISORDER, UNSPECIFIED WHETHER RECURRENT: ICD-10-CM

## 2022-01-27 DIAGNOSIS — F41.1 GENERALIZED ANXIETY DISORDER: Primary | ICD-10-CM

## 2022-01-27 PROCEDURE — 99999 PR PBB SHADOW E&M-EST. PATIENT-LVL II: CPT | Mod: PBBFAC,,, | Performed by: STUDENT IN AN ORGANIZED HEALTH CARE EDUCATION/TRAINING PROGRAM

## 2022-01-27 PROCEDURE — 99999 PR PBB SHADOW E&M-EST. PATIENT-LVL II: ICD-10-PCS | Mod: PBBFAC,,, | Performed by: STUDENT IN AN ORGANIZED HEALTH CARE EDUCATION/TRAINING PROGRAM

## 2022-01-27 PROCEDURE — 99215 PR OFFICE/OUTPT VISIT, EST, LEVL V, 40-54 MIN: ICD-10-PCS | Mod: S$GLB,,, | Performed by: STUDENT IN AN ORGANIZED HEALTH CARE EDUCATION/TRAINING PROGRAM

## 2022-01-27 PROCEDURE — 99215 OFFICE O/P EST HI 40 MIN: CPT | Mod: S$GLB,,, | Performed by: STUDENT IN AN ORGANIZED HEALTH CARE EDUCATION/TRAINING PROGRAM

## 2022-01-27 RX ORDER — BUPROPION HYDROCHLORIDE 150 MG/1
150 TABLET ORAL DAILY
Qty: 30 TABLET | Refills: 0 | Status: SHIPPED | OUTPATIENT
Start: 2022-01-27 | End: 2022-03-03

## 2022-01-27 RX ORDER — HYDROXYZINE HYDROCHLORIDE 25 MG/1
25 TABLET, FILM COATED ORAL NIGHTLY PRN
Qty: 30 TABLET | Refills: 0 | Status: SHIPPED | OUTPATIENT
Start: 2022-01-27 | End: 2022-03-03

## 2022-01-27 NOTE — PROGRESS NOTES
OUTPATIENT PSYCHIATRY FOLLOW-UP VISIT    ENCOUNTER DATE:  1/27/2022  SITE:  [facility]      CHIEF COMPLAINT:   Depression    HISTORY OF PRESENTING ILLNESS:  Sarahy Moreira is a 26 y.o. female with history of PTSD who presents for follow up.       History as told by Patient - & or family/friend/spouse/caregiver with pts permission  Pt reports having ups and downs in mood, 2 episodes of down/depressed mood lasting 2 days each where pt has low energy, motivation, sleeping excessively, feeling empty and numb. Passive thoughts of not wanting to live, but no plan, intent or action, no hx SA. Work is flexible so not missing any required days of work. School just restarted and going OK at this time. Pt taking lexapro 10mg, has not noticed benefit, but did notice that ups and downs are more amplified. Ups described as 2 days of feeling really good, energized, active. Pt on those days drinks ~4 drinks alcohol, and usually also uses cocaine. Discussed likely effects on mood, pt in agreement, endorses interest and desire to eliminate cocaine use. Also discussed potential contributions of marijuana on mood and anxiety, pt uses nightly to help fall asleep. Pt has trouble falling and staying asleep, and also trouble waking up in morning. Discussed medication, pt more interested in switching to wellbutrin and trial of hydroxyzine prn.         PSYCHIATRIC REVIEW OF SYSTEMS:(none/ yes- better/worse/stable/& what symptoms)    Symptoms of Depression:  Still present    Symptoms of Anxiety/ panic attacks: no improvement    Symptoms of Emely/Hypomania:  Ups associated with then drinking and cocaine use.    Symptoms of psychosis:  none    Sleep:   Up to 3 hours sleep onset    Appetite:  low    Alcohol:  1-2x per week    Illicit Drugs:  Use of Marijuana to wind down from work and to help with sleep.     Psychosocial stressors:  Work, relationships    Risk Parameters:  Patient reports no suicidal  ideation  Patient reports no homicidal ideation  Patient reports no self-injurious behavior  Patient reports no violent behavior    PSYCHIATRIC MED REVIEW  Nortriptyline, gabapentin  Hx effexor    Current psych meds  Medication side effects:  none  Medication compliance:  yes    Previous psych meds trials  Notriptyline, gabapentin. Hx effexo    MEDICAL REVIEW OF SYSTEMS:  Complete review of systems performed covering Constitutional, Cardiovascular, Respiratory, Gastrointestinal, Musculoskeletal, Skin, Neurologic and Endocrine  All systems negative/ except for  Headaches.     MEDICAL HISTORY:  No past medical history on file.    ALL MEDICATIONS:    Current Outpatient Medications:     AIMOVIG AUTOINJECTOR 70 mg/mL autoinjector, ADMINISTER 1 ML(70 MG) UNDER THE SKIN EVERY 28 DAYS, Disp: 1 mL, Rfl: 5    EScitalopram oxalate (LEXAPRO) 10 MG tablet, Take 1 tablet (10 mg total) by mouth once daily., Disp: 30 tablet, Rfl: 0    gabapentin (NEURONTIN) 300 MG capsule, Take 2 capsules (600 mg total) by mouth 3 (three) times daily., Disp: 180 capsule, Rfl: 11    norethindrone (MICRONOR) 0.35 mg tablet, , Disp: , Rfl:     ALLERGIES:  Review of patient's allergies indicates:  No Known Allergies    RELEVANT LABS/STUDIES:    No results found for: WBC, HGB, HCT, MCV, PLT  BMP  No results found for: NA, K, CL, CO2, BUN, CREATININE, CALCIUM, ANIONGAP, ESTGFRAFRICA, EGFRNONAA  No results found for: ALT, AST, GGT, ALKPHOS, BILITOT  No results found for: TSH  No results found for: LABA1C, HGBA1C      VITALS  Vitals:    01/27/22 0903   BP: 114/68   Pulse: 69   Weight: 96.8 kg (213 lb 6.5 oz)       PHYSICAL EXAM  General: well developed, well nourished  Neurologic:   Gait: Normal   Psychomotor signs:  No involuntary movements or tremor  AIMS: none    PSYCHIATRIC EXAM:    Mental Status Exam:  Appearance: unremarkable, age appropriate  Behavior/Cooperation: limited/ appropriate normal, cooperative  Speech:  normal tone, normal rate,  normal pitch, normal volume  Language: uses words appropriately; NO aphasia or dysarthria  Mood: depressed, ups and downs  Affect:  constricted, otherwise reactive, appropriate  Thought Process: normal and logical  Thought Content: normal, no suicidality, no homicidality, delusions, or paranoia  Level of Consciousness: Alert and Oriented x3  Memory:  Intact  Attention/concentration: appropriate for age/education.   Fund of Knowledge: appears adequate  Insight:  Intact  Judgment: Intact       IMPRESSION:    Sarahy Moreira is a 26 y.o. female with history of PTSD who presents for follow up. Symptoms of MDD and MONCHO.   Unclear if previously has had true hypomanic episodes or if variation of normal and/or fueled by caffeine, cannabis use. Discussed diagnoses with pt, agrees to plan for SSRI trial, monitoring response and manic symptoms, and to call if experiencing manic symptoms/episode.     DIAGNOSES:    ICD-10-CM ICD-9-CM   1. Generalized anxiety disorder  F41.1 300.02   2. Current moderate episode of major depressive disorder, unspecified whether recurrent  F32.1 296.22   Cocaine, alcohol and cannabis use.  Rule out Bipolar 2 disorder, mre depression    PLAN:  Psych Med:   Discontinue lexapro 10mg.    Start wellbutrin XL 150mg .   Trial hydroxyzine 25mg prn nightly sleep.    Discontinue nortriptyline, see neurologist for treatment of migraines.      Discussed with patient informed consent, risks vs. benefits, alternative treatments, side effect profile and the inherent unpredictability of individual responses to these treatments. Answered any questions patient may have had. The patient expresses understanding of the above and displays the capacity to agree with this current plan     Other:     RETURN TO CLINIC: 1 month    Nicholas Molina MD  [facility], PGY-3  01/27/2022

## 2022-02-01 ENCOUNTER — OFFICE VISIT (OUTPATIENT)
Dept: PSYCHIATRY | Facility: CLINIC | Age: 27
End: 2022-02-01
Payer: COMMERCIAL

## 2022-02-01 DIAGNOSIS — F32.1 CURRENT MODERATE EPISODE OF MAJOR DEPRESSIVE DISORDER, UNSPECIFIED WHETHER RECURRENT: ICD-10-CM

## 2022-02-01 DIAGNOSIS — F41.1 GENERALIZED ANXIETY DISORDER: Primary | ICD-10-CM

## 2022-02-01 PROCEDURE — 90834 PSYTX W PT 45 MINUTES: CPT | Mod: 95,,, | Performed by: SOCIAL WORKER

## 2022-02-01 PROCEDURE — 90834 PR PSYCHOTHERAPY W/PATIENT, 45 MIN: ICD-10-PCS | Mod: 95,,, | Performed by: SOCIAL WORKER

## 2022-02-02 NOTE — PROGRESS NOTES
"Individual Psychotherapy (PhD/LCSW)    2/1/2022    The patient location is: [place]  The chief complaint leading to consultation is: depression and anxiety    Visit type: audiovisual    Face to Face time with patient: 45 minutes  60 minutes of total time spent on the encounter, which includes face to face time and non-face to face time preparing to see the patient (eg, review of tests), Obtaining and/or reviewing separately obtained history, Documenting clinical information in the electronic or other health record, Independently interpreting results (not separately reported) and communicating results to the patient/family/caregiver, or Care coordination (not separately reported).         Each patient to whom he or she provides medical services by telemedicine is:  (1) informed of the relationship between the physician and patient and the respective role of any other health care provider with respect to management of the patient; and (2) notified that he or she may decline to receive medical services by telemedicine and may withdraw from such care at any time.    Site:  [facility]         Therapeutic Intervention: Met with patient.  Outpatient - Insight oriented psychotherapy 45 min - CPT code 85124, Outpatient - Behavior modifying psychotherapy 45 min - CPT code 99515 and Outpatient - Supportive psychotherapy 45 min - CPT Code 47497    Chief complaint/reason for encounter: depression     Interval history and content of current session: Pt is a 26 year old single white female who presents today for a follow up therapy session. Pt reports that she visited her boyfriend in [place] a few weekends ago. Pt states that she enjoyed herself much of the time but also had some frustrating conversations with him, as she reports he seemed disinterested in visiting [place]. Pt reports that he stated he had "already been there done that." Pt reports that this comment triggered a lot of emotions for the pt and she locked " herself in the bathroom for a short period of time. Pt states she and boyfriend were able to talk through her hurt feelings, and she states that he ultimately apologized and committed to coming to [place] to visit her. Despite this, pt continues to struggle with setting clear boundaries around her needs in the relationship. Clinician and pt talked about strategies for how to do this when pt is ready.    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 She states that while she agrees with the decrease in job responsibility as it was becoming too overwhelming, she wished her bosses would have addressed the issues they were having with her first before making the decision without getting her input. 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 Pt states that she is excited about this and expresses motivation to keep her work schedule manageable while finishing out her last semester in school. Pt states that while she finishes in May, she is unclear about her future and whether she will stay in [place] or move somewhere else.     Treatment plan:  · Target symptoms: depression, anxiety , work stress  · Why chosen therapy is appropriate versus another modality: relevant to diagnosis, patient responds to this modality, evidence based practice  · Outcome monitoring methods: self-report, observation  · Therapeutic intervention type: insight oriented psychotherapy, behavior modifying psychotherapy, supportive psychotherapy    Risk parameters:  Patient reports suicidal ideation: passive SI, denies active plan  Patient reports no homicidal ideation  Patient reports no self-injurious behavior  Patient reports no violent behavior    Verbal deficits: None    Patient's response to intervention:  The patient's response to intervention is accepting.    Progress toward goals and other mental  status changes:  The patient's progress toward goals is excellent.    Diagnosis:     ICD-10-CM ICD-9-CM   1. Generalized anxiety disorder  F41.1 300.02   2. Current moderate episode of major depressive disorder, unspecified whether recurrent  F32.1 296.22       Plan:  individual psychotherapy and medication management by physician     Pt recently switched from Lexapro to Wellbutrin. In addition, Vistaril added to assist with pt's sleep.     Return to clinic: 1 week    Length of Service (minutes): 45

## 2022-02-03 ENCOUNTER — OFFICE VISIT (OUTPATIENT)
Dept: NEUROLOGY | Facility: CLINIC | Age: 27
End: 2022-02-03
Payer: COMMERCIAL

## 2022-02-03 VITALS
WEIGHT: 210.75 LBS | HEIGHT: 64 IN | SYSTOLIC BLOOD PRESSURE: 117 MMHG | BODY MASS INDEX: 35.98 KG/M2 | DIASTOLIC BLOOD PRESSURE: 63 MMHG | HEART RATE: 63 BPM

## 2022-02-03 DIAGNOSIS — G43.119 INTRACTABLE MIGRAINE WITH AURA WITHOUT STATUS MIGRAINOSUS: Primary | ICD-10-CM

## 2022-02-03 PROCEDURE — 3078F PR MOST RECENT DIASTOLIC BLOOD PRESSURE < 80 MM HG: ICD-10-PCS | Mod: CPTII,S$GLB,, | Performed by: NEUROLOGICAL SURGERY

## 2022-02-03 PROCEDURE — 3078F DIAST BP <80 MM HG: CPT | Mod: CPTII,S$GLB,, | Performed by: NEUROLOGICAL SURGERY

## 2022-02-03 PROCEDURE — 3074F SYST BP LT 130 MM HG: CPT | Mod: CPTII,S$GLB,, | Performed by: NEUROLOGICAL SURGERY

## 2022-02-03 PROCEDURE — 3008F BODY MASS INDEX DOCD: CPT | Mod: CPTII,S$GLB,, | Performed by: NEUROLOGICAL SURGERY

## 2022-02-03 PROCEDURE — 99999 PR PBB SHADOW E&M-EST. PATIENT-LVL III: ICD-10-PCS | Mod: PBBFAC,,, | Performed by: NEUROLOGICAL SURGERY

## 2022-02-03 PROCEDURE — 3074F PR MOST RECENT SYSTOLIC BLOOD PRESSURE < 130 MM HG: ICD-10-PCS | Mod: CPTII,S$GLB,, | Performed by: NEUROLOGICAL SURGERY

## 2022-02-03 PROCEDURE — 99214 PR OFFICE/OUTPT VISIT, EST, LEVL IV, 30-39 MIN: ICD-10-PCS | Mod: S$GLB,,, | Performed by: NEUROLOGICAL SURGERY

## 2022-02-03 PROCEDURE — 3008F PR BODY MASS INDEX (BMI) DOCUMENTED: ICD-10-PCS | Mod: CPTII,S$GLB,, | Performed by: NEUROLOGICAL SURGERY

## 2022-02-03 PROCEDURE — 99999 PR PBB SHADOW E&M-EST. PATIENT-LVL III: CPT | Mod: PBBFAC,,, | Performed by: NEUROLOGICAL SURGERY

## 2022-02-03 PROCEDURE — 1159F PR MEDICATION LIST DOCUMENTED IN MEDICAL RECORD: ICD-10-PCS | Mod: CPTII,S$GLB,, | Performed by: NEUROLOGICAL SURGERY

## 2022-02-03 PROCEDURE — 99214 OFFICE O/P EST MOD 30 MIN: CPT | Mod: S$GLB,,, | Performed by: NEUROLOGICAL SURGERY

## 2022-02-03 PROCEDURE — 1159F MED LIST DOCD IN RCRD: CPT | Mod: CPTII,S$GLB,, | Performed by: NEUROLOGICAL SURGERY

## 2022-02-03 RX ORDER — ATOGEPANT 60 MG/1
60 TABLET ORAL DAILY
Qty: 30 TABLET | Refills: 5 | Status: SHIPPED | OUTPATIENT
Start: 2022-02-03 | End: 2022-07-27

## 2022-02-03 NOTE — PROGRESS NOTES
Chief Complaint   Patient presents with    Medication Refill        Sarahy Moreira is a 26 y.o. female with a history of multiple medical diagnoses as listed below that presents for evaluation and management of headaches.  She has a history of migraine headaches that were diagnosed at approximately 13.  Since that time she has been having difficulty with controlling her headaches.  She has been tried on various medications including topiramate, beta-blockers, and valproic acid without much success.  Her best control has been she with gabapentin, nortriptyline, and as needed abortive medications.  She has tried sumatriptan both oral and injectable with makes results.  She tends to have headaches that began either in the right or left occiput and seemed to travel for toward the front of the head.  Pain tends to be associated with light and sound sensitivity as well as nausea.  She usually has the aura that precedes many of her headaches.  Recently her headaches semiology has shifted somewhat as she has relies the headaches seem to be more posterior to anterior with regards to radiation he wears that used to start anterior and radiate posteriorly.  The headache pain quality, duration, intensity however all seemed to be about the same compared to her baseline experience.    Interval History  10/14/2021  Medications have been well tolerated.without any side effects. She feels that she has been having improvement since she has been taking her medications. There has been no transformation in her headache semiology.     02/03/20222  She has been using her medications as directed.  Aimovig has offer her some improvement in her headache frequency, intensity, and duration, but she still has breakthrough headaches often.  She has tried to use sumatriptan and ibuprofen or Excedrin to relieve her headaches as needed but finds that the response has been incomplete having about an 80% response rate by her estimation.  In the past  she has used topiramate.  Tricyclic antidepressants were stopped by her psychiatrist due to concern about interactions with her other medications.  Beta-blockers have been tried and failed in the past.    PAST MEDICAL HISTORY:  No past medical history on file.    PAST SURGICAL HISTORY:  Past Surgical History:   Procedure Laterality Date    ANKLE LIGAMENT RECONSTRUCTION Left     ankle    BIOPSY OF TONSILS      HAND CAPSULECTOMY Right        SOCIAL HISTORY:  Social History     Socioeconomic History    Marital status: Single   Tobacco Use    Smoking status: Current Some Day Smoker    Smokeless tobacco: Current User       FAMILY HISTORY:  No family history on file.    ALLERGIES AND MEDICATIONS: updated and reviewed.  Review of patient's allergies indicates:  No Known Allergies  Current Outpatient Medications   Medication Sig Dispense Refill    atogepant (QULIPTA) 60 mg Tab Take 60 mg by mouth once daily. 30 tablet 5    buPROPion (WELLBUTRIN XL) 150 MG TB24 tablet Take 1 tablet (150 mg total) by mouth once daily. 30 tablet 0    gabapentin (NEURONTIN) 300 MG capsule Take 2 capsules (600 mg total) by mouth 3 (three) times daily. 180 capsule 11    hydrOXYzine HCL (ATARAX) 25 MG tablet Take 1 tablet (25 mg total) by mouth nightly as needed (sleep). 30 tablet 0    norethindrone (MICRONOR) 0.35 mg tablet        No current facility-administered medications for this visit.       Review of Systems   Constitutional: Negative for activity change, appetite change, fever and unexpected weight change.   HENT: Negative for trouble swallowing and voice change.    Eyes: Positive for photophobia and visual disturbance.   Respiratory: Negative for apnea and shortness of breath.    Cardiovascular: Negative for chest pain and leg swelling.   Gastrointestinal: Positive for nausea and vomiting. Negative for constipation.   Genitourinary: Negative for difficulty urinating.   Musculoskeletal: Negative for back pain, gait problem and  neck pain.   Skin: Negative for color change and pallor.   Neurological: Positive for headaches. Negative for dizziness, seizures, syncope, weakness and numbness.   Hematological: Negative for adenopathy.   Psychiatric/Behavioral: Negative for agitation, confusion and decreased concentration.       Neurologic Exam     Mental Status   Oriented to person, place, and time.   Registration: recalls 3 of 3 objects.   Attention: normal. Concentration: normal.   Speech: speech is normal   Level of consciousness: alert  Knowledge: good.     Cranial Nerves     CN II   Right visual field deficit: none  Left visual field deficit: none     CN III, IV, VI   Extraocular motions are normal.   Right pupil: Size: 3 mm. Shape: regular.   Left pupil: Size: 3 mm. Shape: regular.   CN III: no CN III palsy  CN VI: no CN VI palsy  Nystagmus: none   Diplopia: none  Ophthalmoparesis: none  Upgaze: normal  Downgaze: normal  Conjugate gaze: present    CN VII   Facial expression full, symmetric.   Right facial weakness: none  Left facial weakness: none    CN VIII   CN VIII normal.     CN XI   CN XI normal.     CN XII   CN XII normal.   Tongue deviation: none    Motor Exam   Muscle bulk: normal  Overall muscle tone: normal  Right arm tone: normal  Left arm tone: normal  Right leg tone: normal  Left leg tone: normal    Gait, Coordination, and Reflexes     Gait  Gait: normal    Coordination   Finger to nose coordination: normal    Tremor   Resting tremor: absent      Physical Exam  Vitals reviewed.   Constitutional:       Appearance: She is well-developed.   HENT:      Head: Normocephalic and atraumatic.   Eyes:      Extraocular Movements: EOM normal.   Pulmonary:      Effort: Pulmonary effort is normal. No respiratory distress.   Musculoskeletal:         General: Normal range of motion.      Cervical back: Normal range of motion.   Neurological:      Mental Status: She is alert and oriented to person, place, and time.      Coordination:  "Finger-Nose-Finger Test normal.      Gait: Gait is intact.   Psychiatric:         Speech: Speech normal.         Behavior: Behavior normal.         Thought Content: Thought content normal.         Vitals:    02/03/22 0907   BP: 117/63   Pulse: 63   Weight: 95.6 kg (210 lb 12.2 oz)   Height: 5' 4" (1.626 m)       Assessment & Plan:    Problem List Items Addressed This Visit     Intractable migraine with aura without status migrainosus - Primary    Overview     Patient expresses a phobia of needles but is willing to try Aimovig as a preventative medication in addition to her gabapentin and nortriptyline.  If this medications and affected then we may need to consider using a higher dose of gabapentin for prevention         Relevant Medications    atogepant (QULIPTA) 60 mg Tab          Follow-up: Follow up in about 6 months (around 8/3/2022).    This note was done with the assistance of voice recognition software. Some errors may be present after proofreading.            "

## 2022-02-08 ENCOUNTER — OFFICE VISIT (OUTPATIENT)
Dept: PSYCHIATRY | Facility: CLINIC | Age: 27
End: 2022-02-08
Payer: COMMERCIAL

## 2022-02-08 DIAGNOSIS — F32.1 CURRENT MODERATE EPISODE OF MAJOR DEPRESSIVE DISORDER, UNSPECIFIED WHETHER RECURRENT: Primary | ICD-10-CM

## 2022-02-08 PROCEDURE — 90834 PSYTX W PT 45 MINUTES: CPT | Mod: S$GLB,,, | Performed by: SOCIAL WORKER

## 2022-02-08 PROCEDURE — 90834 PR PSYCHOTHERAPY W/PATIENT, 45 MIN: ICD-10-PCS | Mod: S$GLB,,, | Performed by: SOCIAL WORKER

## 2022-02-08 NOTE — PROGRESS NOTES
"  Individual Psychotherapy (PhD/LCSW)    2/8/2022    Site:  [facility]         Therapeutic Intervention: Met with patient.  Outpatient - Insight oriented psychotherapy 45 min - CPT code 75121, Outpatient - Behavior modifying psychotherapy 45 min - CPT code 01496 and Outpatient - Supportive psychotherapy 45 min - CPT Code 57694    Chief complaint/reason for encounter: depression     Interval history and content of current session: Pt is a 26 year old single white female who presents today for a follow up therapy session. Pt was AAOx4, pleasant but "tired." Affect was flat. Pt reports that she recently notified her manager at [employer] that she is actively looking for new jobs. She reports that manager was expecting this but also disappointed as pt is a valued employee. Pt reports her plan is to eventually stop working at [employer] once foot is in the door at [employer]. Pt also has a job interview for [employer] as a . Pt reports that her mood remains about the same, still somewhat depressed. Pt reports that she continues to struggle with motivation. Pt reports change in medication to Wellbutrin from Lexapro about two weeks ago due to side effects. Pt states that she is tolerating the medication well but has not noticed an improvement in mood since the change. Pt states that she has been speaking with boyfriend Geovanny over the phone on a regular basis but they have not planned a scheduled time to visit one another. Pt remains hesitant in stating her wishes for bf to visit her on Valentine's Day. Pt fearful of rejection. Pt reports that she is good at stating needs with her friends but struggles in relationships because of past rejection. Pt reports that she has cut back on marijuana from 7 days to every other day. Pt states that she primarily smokes in the evenings after work. Pt reports cocaine use remains recreational. Pt reports that she has enjoyed training at [employer] and feels the " management is significantly more organized and professional. Pt started final semester of school a few weeks ago and states that it is less stressful in comparison to last semester. Pt to see her family around graduation.     Treatment plan:  · Target symptoms: depression, anxiety , work stress  · Why chosen therapy is appropriate versus another modality: relevant to diagnosis, patient responds to this modality, evidence based practice  · Outcome monitoring methods: self-report, observation  · Therapeutic intervention type: insight oriented psychotherapy, behavior modifying psychotherapy, supportive psychotherapy    Risk parameters:  Patient reports suicidal ideation: passive SI, denies active plan  Patient reports no homicidal ideation  Patient reports no self-injurious behavior  Patient reports no violent behavior    Verbal deficits: None    Patient's response to intervention:  The patient's response to intervention is accepting.    Progress toward goals and other mental status changes:  The patient's progress toward goals is excellent.    Diagnosis:     ICD-10-CM ICD-9-CM   1. Current moderate episode of major depressive disorder, unspecified whether recurrent  F32.1 296.22       Plan:  individual psychotherapy and medication management by physician       Return to clinic: 1 week    Length of Service (minutes): 45

## 2022-02-22 ENCOUNTER — OFFICE VISIT (OUTPATIENT)
Dept: PSYCHIATRY | Facility: CLINIC | Age: 27
End: 2022-02-22
Payer: COMMERCIAL

## 2022-02-22 DIAGNOSIS — F32.1 CURRENT MODERATE EPISODE OF MAJOR DEPRESSIVE DISORDER, UNSPECIFIED WHETHER RECURRENT: Primary | ICD-10-CM

## 2022-02-22 DIAGNOSIS — F41.1 GENERALIZED ANXIETY DISORDER: ICD-10-CM

## 2022-02-22 PROCEDURE — 90834 PSYTX W PT 45 MINUTES: CPT | Mod: S$GLB,,, | Performed by: SOCIAL WORKER

## 2022-02-22 PROCEDURE — 90834 PR PSYCHOTHERAPY W/PATIENT, 45 MIN: ICD-10-PCS | Mod: S$GLB,,, | Performed by: SOCIAL WORKER

## 2022-02-22 NOTE — PROGRESS NOTES
Individual Psychotherapy (PhD/LCSW)    2/22/2022    Site:  [facility]         Therapeutic Intervention: Met with patient.  Outpatient - Insight oriented psychotherapy 45 min - CPT code 66487, Outpatient - Behavior modifying psychotherapy 45 min - CPT code 04944 and Outpatient - Supportive psychotherapy 45 min - CPT Code 05661    Chief complaint/reason for encounter: depression     Interval history and content of current session: Pt is a 26 year old single white female who presents today for a follow up therapy session. Pt reports that last day at [employer] was last Wednesday. Pt reports instant relief as she finished her shift. Pt endorses some excitement about starting new job as a  at [employer]. Pt to work there Fri-Sun and continues to enjoy working at [employer] twice a week. Pt reports that she has kept Mon and Tues for her days off as she has classes on these days. Pt reports that her final semester feels manageable. 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 Pt reports that she is able to appreciate their comments without getting defensive and plans to take a few months off in the summer to work and apply for jobs. Pt continues to endorse blurred boundaries with boyfriend Geovanny. Pt describes continued difficulty with stating her needs clearly. Pt describes that he is also vague in his commitment to her. 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     Treatment plan:  · Target symptoms: depression, anxiety , work stress  · Why chosen therapy is appropriate versus another modality: relevant to diagnosis, patient responds to this modality, evidence based practice  · Outcome monitoring methods: self-report, observation  · Therapeutic intervention type: insight oriented psychotherapy, behavior  modifying psychotherapy, supportive psychotherapy    Risk parameters:  Patient reports suicidal ideation: passive SI, denies active plan  Patient reports no homicidal ideation  Patient reports no self-injurious behavior  Patient reports no violent behavior    Verbal deficits: None    Patient's response to intervention:  The patient's response to intervention is accepting.    Progress toward goals and other mental status changes:  The patient's progress toward goals is excellent.    Diagnosis:     ICD-10-CM ICD-9-CM   1. Current moderate episode of major depressive disorder, unspecified whether recurrent  F32.1 296.22   2. Generalized anxiety disorder  F41.1 300.02       Plan:  individual psychotherapy and medication management by physician       Return to clinic: 1 week    Length of Service (minutes): 45

## 2022-03-02 ENCOUNTER — OFFICE VISIT (OUTPATIENT)
Dept: PSYCHIATRY | Facility: CLINIC | Age: 27
End: 2022-03-02
Payer: COMMERCIAL

## 2022-03-02 DIAGNOSIS — F33.41 RECURRENT MAJOR DEPRESSIVE DISORDER, IN PARTIAL REMISSION: Primary | ICD-10-CM

## 2022-03-02 PROCEDURE — 90834 PR PSYCHOTHERAPY W/PATIENT, 45 MIN: ICD-10-PCS | Mod: S$GLB,,, | Performed by: SOCIAL WORKER

## 2022-03-02 PROCEDURE — 90834 PSYTX W PT 45 MINUTES: CPT | Mod: S$GLB,,, | Performed by: SOCIAL WORKER

## 2022-03-02 NOTE — PROGRESS NOTES
"  Individual Psychotherapy (PhD/LCSW)    3/2/2022    Site:  [facility]         Therapeutic Intervention: Met with patient.  Outpatient - Insight oriented psychotherapy 45 min - CPT code 31262, Outpatient - Behavior modifying psychotherapy 45 min - CPT code 77563 and Outpatient - Supportive psychotherapy 45 min - CPT Code 60105    Chief complaint/reason for encounter: depression     Interval history and content of current session: Pt is a 26 year old single white female who presents today for a follow up therapy session. Pt AAOx4, pleasant and more easily engaged. Pt reports that she worked 6 days straight and today is first day off. Pt does report that she has a class for an hour after the session. Pt reports that she is very tired and feels like she could sleep for days. Pt reports that overall, things are "okay." Pt is liking her new jobs at [employer] and [employer]. Pt reports that the more distance she has from old job at [employer], the more she recognizes that it was a good decision to leave. Pt reports that she and boyfriend Geovanny are flying to [place] at the end of the month for pt's Spring Break. Pt states that she was more communicative with him about her frustration with how they do not talk about what they are feeling. Pt reports that after that phone call, boyfriend more committed to making plans to go with pt to [place]. Pt reports that she is getting out of the house for pleasure. She attends dog training sessions at [facility] [employer] and is feeling more motivated to rent a Kayak there and explore other parts of the park. Overall, pt appears to be making good progress.    Treatment plan:  · Target symptoms: depression, anxiety , work stress  · Why chosen therapy is appropriate versus another modality: relevant to diagnosis, patient responds to this modality, evidence based practice  · Outcome monitoring methods: self-report, observation  · Therapeutic intervention type: insight oriented psychotherapy, " behavior modifying psychotherapy, supportive psychotherapy    Risk parameters:  Patient reports suicidal ideation: passive SI, denies active plan  Patient reports no homicidal ideation  Patient reports no self-injurious behavior  Patient reports no violent behavior    Verbal deficits: None    Patient's response to intervention:  The patient's response to intervention is accepting.    Progress toward goals and other mental status changes:  The patient's progress toward goals is excellent.    Diagnosis:     ICD-10-CM ICD-9-CM   1. Recurrent major depressive disorder, in partial remission  F33.41 296.35       Plan:  individual psychotherapy and medication management by physician       Return to clinic: 1 week    Length of Service (minutes): 45

## 2022-03-03 ENCOUNTER — OFFICE VISIT (OUTPATIENT)
Dept: PSYCHIATRY | Facility: CLINIC | Age: 27
End: 2022-03-03
Payer: COMMERCIAL

## 2022-03-03 VITALS
SYSTOLIC BLOOD PRESSURE: 120 MMHG | BODY MASS INDEX: 35.97 KG/M2 | WEIGHT: 209.56 LBS | HEART RATE: 64 BPM | DIASTOLIC BLOOD PRESSURE: 65 MMHG

## 2022-03-03 DIAGNOSIS — F41.1 GENERALIZED ANXIETY DISORDER: ICD-10-CM

## 2022-03-03 DIAGNOSIS — F33.41 RECURRENT MAJOR DEPRESSIVE DISORDER, IN PARTIAL REMISSION: Primary | ICD-10-CM

## 2022-03-03 PROCEDURE — 99214 OFFICE O/P EST MOD 30 MIN: CPT | Mod: S$GLB,,, | Performed by: STUDENT IN AN ORGANIZED HEALTH CARE EDUCATION/TRAINING PROGRAM

## 2022-03-03 PROCEDURE — 99214 PR OFFICE/OUTPT VISIT, EST, LEVL IV, 30-39 MIN: ICD-10-PCS | Mod: S$GLB,,, | Performed by: STUDENT IN AN ORGANIZED HEALTH CARE EDUCATION/TRAINING PROGRAM

## 2022-03-03 PROCEDURE — 99999 PR PBB SHADOW E&M-EST. PATIENT-LVL II: CPT | Mod: PBBFAC,,, | Performed by: STUDENT IN AN ORGANIZED HEALTH CARE EDUCATION/TRAINING PROGRAM

## 2022-03-03 PROCEDURE — 99999 PR PBB SHADOW E&M-EST. PATIENT-LVL II: ICD-10-PCS | Mod: PBBFAC,,, | Performed by: STUDENT IN AN ORGANIZED HEALTH CARE EDUCATION/TRAINING PROGRAM

## 2022-03-03 RX ORDER — BUPROPION HYDROCHLORIDE 300 MG/1
300 TABLET ORAL DAILY
Qty: 30 TABLET | Refills: 1 | Status: SHIPPED | OUTPATIENT
Start: 2022-03-03 | End: 2022-04-07 | Stop reason: SDUPTHER

## 2022-03-03 RX ORDER — HYDROXYZINE HYDROCHLORIDE 25 MG/1
25 TABLET, FILM COATED ORAL NIGHTLY PRN
Qty: 30 TABLET | Refills: 1 | Status: SHIPPED | OUTPATIENT
Start: 2022-03-03 | End: 2022-05-05 | Stop reason: SDUPTHER

## 2022-03-03 NOTE — PROGRESS NOTES
OUTPATIENT PSYCHIATRY FOLLOW-UP VISIT    ENCOUNTER DATE:  3/3/2022  SITE:  [facility]      CHIEF COMPLAINT:   Depression    HISTORY OF PRESENTING ILLNESS:  Sarahy Moreira is a 26 y.o. female with history of PTSD who presents for follow up.       History as told by Patient - & or family/friend/spouse/caregiver with pts permission  Pt reports things are mostly the same. Denies significant depressed days, but reports not enjoying things and just feels present.  Sleep onset improved with hydroxyzine, takes ~4x/wk, but does not help maintain, pt getting ~6 hours nightly due to being busy at work recently, and school, has not had much chance to engage in pleasurable activities. Taking wellbutrin daily, denies adverse effects, denies noticing significant improvements.  Now cutting back on cannabis use and drinking about once a week (was twice/wk), same amount. Denies recent cocaine use.  Cousneled pt on possible effects of cannabis on mood, energy, motivation, anhedonia. Taking new medication for migraines which is working great.  Pt still reports anxiety all the time, about work, school, tasks which remain not done. Still having passive thoughts of not wanting to live. Engaged in therapy weekly and endorses much benefit.         PSYCHIATRIC REVIEW OF SYSTEMS:(none/ yes- better/worse/stable/& what symptoms)    Symptoms of Depression:  Still present, mostly low energy and anhedonia    Symptoms of Anxiety/ panic attacks: no improvement    Symptoms of Emely/Hypomania:  Ups associated with then drinking and cocaine use.    Symptoms of psychosis:  none    Sleep:   Better onset, ~6h/night    Appetite:  low    Alcohol:  1x per week    Illicit Drugs:  Cutting back on marijuana, now used recreationally only. Denies recent cocaine use.     Psychosocial stressors:  Work, relationships    Risk Parameters:  Patient reports no suicidal ideation  Patient reports no homicidal ideation  Patient reports no  self-injurious behavior  Patient reports no violent behavior    PSYCHIATRIC MED REVIEW  Nortriptyline, gabapentin  Hx effexor  Lexapro 10mg not efficacious    Current psych meds  Medication side effects:  none  Medication compliance:  yes    Previous psych meds trials  Notriptyline, gabapentin. Hx effexor    MEDICAL REVIEW OF SYSTEMS:  Complete review of systems performed covering Constitutional, Cardiovascular, Respiratory, Gastrointestinal, Musculoskeletal, Skin, Neurologic and Endocrine  All systems negative/ except for  Headaches.     MEDICAL HISTORY:  No past medical history on file.    ALL MEDICATIONS:    Current Outpatient Medications:     atogepant (QULIPTA) 60 mg Tab, Take 60 mg by mouth once daily., Disp: 30 tablet, Rfl: 5    buPROPion (WELLBUTRIN XL) 300 MG 24 hr tablet, Take 1 tablet (300 mg total) by mouth once daily., Disp: 30 tablet, Rfl: 1    gabapentin (NEURONTIN) 300 MG capsule, Take 2 capsules (600 mg total) by mouth 3 (three) times daily., Disp: 180 capsule, Rfl: 11    hydrOXYzine HCL (ATARAX) 25 MG tablet, Take 1 tablet (25 mg total) by mouth nightly as needed (sleep)., Disp: 30 tablet, Rfl: 1    norethindrone (MICRONOR) 0.35 mg tablet, , Disp: , Rfl:     ALLERGIES:  Review of patient's allergies indicates:  No Known Allergies    RELEVANT LABS/STUDIES:    No results found for: WBC, HGB, HCT, MCV, PLT  BMP  No results found for: NA, K, CL, CO2, BUN, CREATININE, CALCIUM, ANIONGAP, ESTGFRAFRICA, EGFRNONAA  No results found for: ALT, AST, GGT, ALKPHOS, BILITOT  No results found for: TSH  No results found for: LABA1C, HGBA1C      VITALS  Vitals:    03/03/22 1101   BP: 120/65   Pulse: 64   Weight: 95.1 kg (209 lb 8.8 oz)       PHYSICAL EXAM  General: well developed, well nourished  Neurologic:   Gait: Normal   Psychomotor signs:  No involuntary movements or tremor  AIMS: none    PSYCHIATRIC EXAM:    Mental Status Exam:  Appearance: unremarkable, age appropriate  Behavior/Cooperation: limited/  "appropriate normal, cooperative  Speech:  normal tone, normal rate, normal pitch, normal volume  Language: uses words appropriately; NO aphasia or dysarthria  Mood: "just present"  Affect:  constricted, otherwise reactive, appropriate  Thought Process: normal and logical  Thought Content: normal, no suicidality, no homicidality, delusions, or paranoia  Level of Consciousness: Alert and Oriented x3  Memory:  Intact  Attention/concentration: appropriate for age/education.   Fund of Knowledge: appears adequate  Insight:  Intact  Judgment: Intact       IMPRESSION:    Sarahy Moreira is a 26 y.o. female with history of PTSD who presents for follow up. Symptoms of MDD and MONCHO.   Unclear if previously has had true hypomanic episodes or if variation of normal and/or fueled by caffeine, cannabis, cocaine use. Discussed diagnoses with pt, agrees to plan for SSRI trial, monitoring response and manic symptoms, and to call if experiencing manic symptoms/episode.     DIAGNOSES:    ICD-10-CM ICD-9-CM   1. Recurrent major depressive disorder, in partial remission  F33.41 296.35   2. Generalized anxiety disorder  F41.1 300.02   Cocaine, alcohol and cannabis use.  Rule out Bipolar 2 disorder, mre depression    PLAN:  Psych Med:   Increase wellbutrin XL to 300mg .   Continue hydroxyzine 25mg prn nightly sleep.    Consider buspirone for anxiety on next visit if needed.     Discussed with patient informed consent, risks vs. benefits, alternative treatments, side effect profile and the inherent unpredictability of individual responses to these treatments. Answered any questions patient may have had. The patient expresses understanding of the above and displays the capacity to agree with this current plan     Other:     RETURN TO CLINIC: 1 month    Nicholas Molina MD  [facility], PGY-3  03/03/2022        " [Negative] : Genitourinary

## 2022-03-08 ENCOUNTER — OFFICE VISIT (OUTPATIENT)
Dept: PSYCHIATRY | Facility: CLINIC | Age: 27
End: 2022-03-08
Payer: COMMERCIAL

## 2022-03-08 DIAGNOSIS — F41.1 GENERALIZED ANXIETY DISORDER: ICD-10-CM

## 2022-03-08 DIAGNOSIS — F33.41 RECURRENT MAJOR DEPRESSIVE DISORDER, IN PARTIAL REMISSION: Primary | ICD-10-CM

## 2022-03-08 PROCEDURE — 90834 PR PSYCHOTHERAPY W/PATIENT, 45 MIN: ICD-10-PCS | Mod: S$GLB,,, | Performed by: SOCIAL WORKER

## 2022-03-08 PROCEDURE — 90834 PSYTX W PT 45 MINUTES: CPT | Mod: S$GLB,,, | Performed by: SOCIAL WORKER
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
## 2022-03-31 ENCOUNTER — OFFICE VISIT (OUTPATIENT)
Dept: PSYCHIATRY | Facility: CLINIC | Age: 27
End: 2022-03-31
Payer: COMMERCIAL

## 2022-03-31 DIAGNOSIS — F33.41 RECURRENT MAJOR DEPRESSIVE DISORDER, IN PARTIAL REMISSION: Primary | ICD-10-CM

## 2022-03-31 PROCEDURE — 90834 PSYTX W PT 45 MINUTES: CPT | Mod: S$GLB,,, | Performed by: SOCIAL WORKER

## 2022-03-31 PROCEDURE — 90834 PR PSYCHOTHERAPY W/PATIENT, 45 MIN: ICD-10-PCS | Mod: S$GLB,,, | Performed by: SOCIAL WORKER

## 2022-03-31 NOTE — PROGRESS NOTES
Individual Psychotherapy (PhD/LCSW)    3/31/2022    Site:  [facility]         Therapeutic Intervention: Met with patient.  Outpatient - Insight oriented psychotherapy 45 min - CPT code 17421, Outpatient - Behavior modifying psychotherapy 45 min - CPT code 62644 and Outpatient - Supportive psychotherapy 45 min - CPT Code 19058    Chief complaint/reason for encounter: depression     Interval history and content of current session: Pt is a 26 year old single white female who presents today for a follow up therapy session. Pt reports that boyienpriscilla broke up with her yesterday after they returned to [place] from their Spring Trip break to [place] (1-2 weeks). Pt reports that he was very blunt and told pt that the trip highlighted how different they were. Pt states that boyfrienpriscilla Small was supposed to stay with pt for a few extra days but instead had booked a bus back to [place] for the day they arrived back in [place]. Pt states that she offered to drive him to the bus stop, but instead, he left abruptly after breaking up with pt. Pt reports that she has not cried however during the session, pt was able to cry some. Pt reports that she feels a weight off of her shoulders but also feels the stress of having to manage the sadness and anger. Pt denies active plans for suicide and states that she would be able to reach out to a friend if she started feeling this way. Pt reports that she has plans to see friends this evening and will take her dog to the dog park this afternoon. Pt reports that she is pretty busy with school and work starting tomorrow and graduates in 2 months. Pt reports that she enjoyed the trip to [place], and while boyienpriscilla has seemed non-committal, it was a shock when he broke up with her and left the home. Pt states that she believes his motivation was related to the fact that he is going to another country in October for the [employer]. Clinician provided validation, active  listening, and a safe space for pt to process feelings and emotions. Pt flat throughout most of the session despite tearfulness. Pt and clinician reviewed grounding techniques that might be helpful if pt has some suicidality or dissociation. Pt reports that she was zoned out all day yesterday after Geovanny left. Pt reports that she has an appointment scheduled with clinician next week.     Treatment plan:  · Target symptoms: depression, anxiety , work stress  · Why chosen therapy is appropriate versus another modality: relevant to diagnosis, patient responds to this modality, evidence based practice  · Outcome monitoring methods: self-report, observation  · Therapeutic intervention type: insight oriented psychotherapy, behavior modifying psychotherapy, supportive psychotherapy    Risk parameters:  Patient reports suicidal ideation: passive SI, denies active plan  Patient reports no homicidal ideation  Patient reports no self-injurious behavior  Patient reports no violent behavior    Verbal deficits: None    Patient's response to intervention:  The patient's response to intervention is accepting.    Progress toward goals and other mental status changes:  The patient's progress toward goals is excellent.    Diagnosis:     ICD-10-CM ICD-9-CM   1. Recurrent major depressive disorder, in partial remission  F33.41 296.35       Plan:  individual psychotherapy and medication management by physician       Return to clinic: 1 week    Length of Service (minutes): 45

## 2022-04-07 ENCOUNTER — OFFICE VISIT (OUTPATIENT)
Dept: PSYCHIATRY | Facility: CLINIC | Age: 27
End: 2022-04-07
Payer: COMMERCIAL

## 2022-04-07 DIAGNOSIS — F41.1 GENERALIZED ANXIETY DISORDER: ICD-10-CM

## 2022-04-07 DIAGNOSIS — F33.41 RECURRENT MAJOR DEPRESSIVE DISORDER, IN PARTIAL REMISSION: Primary | ICD-10-CM

## 2022-04-07 PROCEDURE — 99214 PR OFFICE/OUTPT VISIT, EST, LEVL IV, 30-39 MIN: ICD-10-PCS | Mod: S$GLB,,, | Performed by: STUDENT IN AN ORGANIZED HEALTH CARE EDUCATION/TRAINING PROGRAM

## 2022-04-07 PROCEDURE — 90834 PSYTX W PT 45 MINUTES: CPT | Mod: S$GLB,,, | Performed by: SOCIAL WORKER

## 2022-04-07 PROCEDURE — 90834 PR PSYCHOTHERAPY W/PATIENT, 45 MIN: ICD-10-PCS | Mod: S$GLB,,, | Performed by: SOCIAL WORKER

## 2022-04-07 PROCEDURE — 99214 OFFICE O/P EST MOD 30 MIN: CPT | Mod: S$GLB,,, | Performed by: STUDENT IN AN ORGANIZED HEALTH CARE EDUCATION/TRAINING PROGRAM

## 2022-04-07 RX ORDER — DOXEPIN HYDROCHLORIDE 10 MG/1
10 CAPSULE ORAL NIGHTLY
Qty: 30 CAPSULE | Refills: 0 | Status: SHIPPED | OUTPATIENT
Start: 2022-04-07 | End: 2022-05-05 | Stop reason: ALTCHOICE

## 2022-04-07 RX ORDER — BUPROPION HYDROCHLORIDE 300 MG/1
300 TABLET ORAL DAILY
Qty: 30 TABLET | Refills: 0 | Status: SHIPPED | OUTPATIENT
Start: 2022-04-07 | End: 2022-05-05 | Stop reason: SDUPTHER

## 2022-04-07 RX ORDER — BUSPIRONE HYDROCHLORIDE 10 MG/1
TABLET ORAL
Qty: 90 TABLET | Refills: 0 | Status: SHIPPED | OUTPATIENT
Start: 2022-04-07 | End: 2022-05-05 | Stop reason: SDUPTHER

## 2022-04-07 NOTE — PROGRESS NOTES
Individual Psychotherapy (PhD/LCSW)    4/7/2022    Site:  [facility]         Therapeutic Intervention: Met with patient.  Outpatient - Insight oriented psychotherapy 45 min - CPT code 05085, Outpatient - Behavior modifying psychotherapy 45 min - CPT code 06880 and Outpatient - Supportive psychotherapy 45 min - CPT Code 60570    Chief complaint/reason for encounter: depression     Interval history and content of current session: Pt is a 26 year old single white female who presents today for a follow up therapy session. Pt reports that she is managing the break up with Geovanny marina. Pt states that she is staying busy with work and school and mostly focused on finishing her last semester. Pt reports that she was able to meet up with friends last weekend and reports that their support was therapeutic. Pt tearful at times during session when talking about the breakup and reports that she is mostly angry and sad about the way that Geovanny broke up with her and leaving her apartment without giving her a chance to respond. Pt not interested in reaching out to him at this time. Clinician and pt discussed strategies for how pt can gain closure even if she doesn't reach out to him directly. Pt reports that she is feeling stress about future plans once she graduates. Pt reports that father continues to mention job opportunities. Pt states that she would like to get through the summer without the burden of thinking about her career as she is feeling burned out from so many years of school. Pt and clinician discussed strategies for communicating wishes to father and asking him to step back right now. Pt coping adequately at this time.    Treatment plan:  · Target symptoms: depression, anxiety , work stress  · Why chosen therapy is appropriate versus another modality: relevant to diagnosis, patient responds to this modality, evidence based practice  · Outcome monitoring methods: self-report, observation  · Therapeutic  intervention type: insight oriented psychotherapy, behavior modifying psychotherapy, supportive psychotherapy    Risk parameters:  Patient reports suicidal ideation: passive SI, denies active plan  Patient reports no homicidal ideation  Patient reports no self-injurious behavior  Patient reports no violent behavior    Verbal deficits: None    Patient's response to intervention:  The patient's response to intervention is accepting.    Progress toward goals and other mental status changes:  The patient's progress toward goals is excellent.    Diagnosis:     ICD-10-CM ICD-9-CM   1. Recurrent major depressive disorder, in partial remission  F33.41 296.35       Plan:  individual psychotherapy and medication management by physician       Return to clinic: 1 week    Length of Service (minutes): 45

## 2022-04-07 NOTE — PROGRESS NOTES
"OUTPATIENT PSYCHIATRY FOLLOW-UP VISIT    ENCOUNTER DATE:  4/7/2022  SITE:  [facility]      CHIEF COMPLAINT:   Depression    HISTORY OF PRESENTING ILLNESS:  Sarahy Moreira is a 26 y.o. female with history of PTSD who presents for follow up.       History as told by Patient - & or family/friend/spouse/caregiver with pts permission  Pt reports things are mostly the same. Taking increased dose of wellbutrin, denies side effect except first 2 days felt floating sensation. Has not noticed any benefits, mood is "naomy", anhedonic, low interest and energy. Has difficulty staying asleep, interrupted throughout the night. Has had decreased alcohol and cannabis use. Staying busy with work 36 hours weekly and full time school, graduating next month.  Describes feeling stressed with incomplete tasks yet is not able to start them due to stress/overwhelmed.  Engaged in therapy and endorses benefit.        PSYCHIATRIC REVIEW OF SYSTEMS:(none/ yes- better/worse/stable/& what symptoms)    Symptoms of Depression:  Still present, mostly low energy and anhedonia    Symptoms of Anxiety/ panic attacks: no improvement    Symptoms of Emely/Hypomania:  Ups associated with then drinking and cocaine use.    Symptoms of psychosis:  none    Sleep:   Interrupted sleep    Appetite:  Low or high, not in middle    Alcohol:  1x per week    Illicit Drugs:  Cutting back on marijuana, now used recreationally only. Denies recent cocaine use.     Psychosocial stressors:  Work, relationships    Risk Parameters:  Patient reports no suicidal ideation  Patient reports no homicidal ideation  Patient reports no self-injurious behavior  Patient reports no violent behavior    PSYCHIATRIC MED REVIEW  Nortriptyline, gabapentin  Hx effexor  Lexapro 10mg not efficacious    Current psych meds  Medication side effects:  none  Medication compliance:  yes    Previous psych meds trials  Notriptyline, gabapentin. Hx effexor    MEDICAL REVIEW OF " "SYSTEMS:  Complete review of systems performed covering Constitutional, Cardiovascular, Respiratory, Gastrointestinal, Musculoskeletal, Skin, Neurologic and Endocrine  All systems negative/ except for  Headaches.     MEDICAL HISTORY:  No past medical history on file.    ALL MEDICATIONS:    Current Outpatient Medications:     atogepant (QULIPTA) 60 mg Tab, Take 60 mg by mouth once daily., Disp: 30 tablet, Rfl: 5    buPROPion (WELLBUTRIN XL) 300 MG 24 hr tablet, Take 1 tablet (300 mg total) by mouth once daily., Disp: 30 tablet, Rfl: 1    gabapentin (NEURONTIN) 300 MG capsule, Take 2 capsules (600 mg total) by mouth 3 (three) times daily., Disp: 180 capsule, Rfl: 11    hydrOXYzine HCL (ATARAX) 25 MG tablet, Take 1 tablet (25 mg total) by mouth nightly as needed (sleep)., Disp: 30 tablet, Rfl: 1    norethindrone (MICRONOR) 0.35 mg tablet, , Disp: , Rfl:     ALLERGIES:  Review of patient's allergies indicates:  No Known Allergies    RELEVANT LABS/STUDIES:    No results found for: WBC, HGB, HCT, MCV, PLT  BMP  No results found for: NA, K, CL, CO2, BUN, CREATININE, CALCIUM, ANIONGAP, ESTGFRAFRICA, EGFRNONAA  No results found for: ALT, AST, GGT, ALKPHOS, BILITOT  No results found for: TSH  No results found for: LABA1C, HGBA1C      VITALS  There were no vitals filed for this visit.    PHYSICAL EXAM  General: well developed, well nourished  Neurologic:   Gait: Normal   Psychomotor signs:  No involuntary movements or tremor  AIMS: none    PSYCHIATRIC EXAM:    Mental Status Exam:  Appearance: unremarkable, age appropriate  Behavior/Cooperation: limited/ appropriate normal, cooperative  Speech:  normal tone, normal rate, normal pitch, normal volume  Language: uses words appropriately; NO aphasia or dysarthria  Mood: "naomy"  Affect:  constricted, otherwise reactive, appropriate  Thought Process: normal and logical  Thought Content: normal, no suicidality, no homicidality, delusions, or paranoia  Level of Consciousness: Alert " and Oriented x3  Memory:  Intact  Attention/concentration: appropriate for age/education.   Fund of Knowledge: appears adequate  Insight:  Intact  Judgment: Intact       IMPRESSION:    Sarahy Moreira is a 26 y.o. female with history of PTSD who presents for follow up. Symptoms of MDD and MONCHO.   Unclear if previously has had true hypomanic episodes or if variation of normal and/or fueled by caffeine, cannabis, cocaine use. Discussed diagnoses with pt, agrees to plan for SSRI trial, monitoring response and manic symptoms, and to call if experiencing manic symptoms/episode.     DIAGNOSES:    ICD-10-CM ICD-9-CM   1. Recurrent major depressive disorder, in partial remission  F33.41 296.35   2. Generalized anxiety disorder  F41.1 300.02   Cocaine, alcohol and cannabis use.  Rule out Bipolar 2 disorder, mre depression    PLAN:  Psych Med:   Continue wellbutrin XL 300mg .   Start doxepin 10mg nightly for insomnia.   Start buspirone 10mg tid for anxiety, take half tablet tid first week.     Continue psychotherapy     Discussed with patient informed consent, risks vs. benefits, alternative treatments, side effect profile and the inherent unpredictability of individual responses to these treatments. Answered any questions patient may have had. The patient expresses understanding of the above and displays the capacity to agree with this current plan     Other:     RETURN TO CLINIC: 1 month    Nicholas Molina MD  [facility], PGY-3  04/07/2022

## 2022-04-11 ENCOUNTER — PATIENT MESSAGE (OUTPATIENT)
Dept: PSYCHIATRY | Facility: CLINIC | Age: 27
End: 2022-04-11
Payer: COMMERCIAL

## 2022-04-14 ENCOUNTER — OFFICE VISIT (OUTPATIENT)
Dept: PSYCHIATRY | Facility: CLINIC | Age: 27
End: 2022-04-14
Payer: COMMERCIAL

## 2022-04-14 DIAGNOSIS — F33.41 RECURRENT MAJOR DEPRESSIVE DISORDER, IN PARTIAL REMISSION: Primary | ICD-10-CM

## 2022-04-14 PROCEDURE — 90834 PSYTX W PT 45 MINUTES: CPT | Mod: 95,,, | Performed by: SOCIAL WORKER

## 2022-04-14 PROCEDURE — 90834 PR PSYCHOTHERAPY W/PATIENT, 45 MIN: ICD-10-PCS | Mod: 95,,, | Performed by: SOCIAL WORKER

## 2022-04-14 NOTE — PROGRESS NOTES
Individual Psychotherapy (PhD/LCSW)    4/14/2022     The patient location is: [place], [place]  The chief complaint leading to consultation is: depression    Visit type: audiovisual    Face to Face time with patient: 45 min  60 minutes of total time spent on the encounter, which includes face to face time and non-face to face time preparing to see the patient (eg, review of tests), Obtaining and/or reviewing separately obtained history, Documenting clinical information in the electronic or other health record, Independently interpreting results (not separately reported) and communicating results to the patient/family/caregiver, or Care coordination (not separately reported).         Each patient to whom he or she provides medical services by telemedicine is:  (1) informed of the relationship between the physician and patient and the respective role of any other health care provider with respect to management of the patient; and (2) notified that he or she may decline to receive medical services by telemedicine and may withdraw from such care at any time.    Notes:     Site:  [facility]         Therapeutic Intervention: Met with patient.  Outpatient - Insight oriented psychotherapy 45 min - CPT code 59525, Outpatient - Behavior modifying psychotherapy 45 min - CPT code 78047 and Outpatient - Supportive psychotherapy 45 min - CPT Code 15490    Chief complaint/reason for encounter: depression     Interval history and content of current session: Pt is a 26 year old single white female who presents today for a follow up therapy session. Pt reports that she has made progress with her Literature Review which is due soon. Pt reports that she hasn't written the review but has found all of her resources. Pt feeling a little better about this, however she reports that she is feeling overwhelmed with the papers and presentation that are also due in the next few weeks. Pt spent time during session talking about how she  would distribute her time throughout the week to study and work on papers. Pt reports that she has a hard time concentrating in quiet areas and needs the television to help her focus. Pt reports that she is frustrated with her messy apartment and has made a list of things she can do each day to clean so that it feels less overwhelming. Pt reports that she has tried not to think too much about Geovanny and reports that she has stayed distracted with work and school. Pt expresses feeling exhausted a lot of the time and states that the sleep medication recently prescribed does not help her to fall asleep. Pt reports that her biggest issue is that once she falls asleep, she has trouble getting up in the morning and could sleep until noon if no alarm were set. Pt reports that she is binging candy at night and is frustrated by this. Binging worsened 1.5 years ago. Pt denies purging. Pt reports that she hardly eats during the day usually because she forgets. Pt and clinician spent time focusing on healthy eating habits. Pt to work on getting in at least 2 meals a day plus snacks and plans to report back to clinician on whether or not binging remained the same, decreased or increased.     Treatment plan:  · Target symptoms: depression, anxiety , work stress  · Why chosen therapy is appropriate versus another modality: relevant to diagnosis, patient responds to this modality, evidence based practice  · Outcome monitoring methods: self-report, observation  · Therapeutic intervention type: insight oriented psychotherapy, behavior modifying psychotherapy, supportive psychotherapy    Risk parameters:  Patient reports suicidal ideation: passive SI, denies active plan  Patient reports no homicidal ideation  Patient reports no self-injurious behavior  Patient reports no violent behavior    Verbal deficits: None    Patient's response to intervention:  The patient's response to intervention is accepting.    Progress toward goals and  other mental status changes:  The patient's progress toward goals is excellent.    Diagnosis:     ICD-10-CM ICD-9-CM   1. Recurrent major depressive disorder, in partial remission  F33.41 296.35       Plan:  individual psychotherapy and medication management by physician       Return to clinic: 1 week    Length of Service (minutes): 45

## 2022-04-21 ENCOUNTER — PATIENT MESSAGE (OUTPATIENT)
Dept: PSYCHIATRY | Facility: CLINIC | Age: 27
End: 2022-04-21
Payer: COMMERCIAL

## 2022-04-21 ENCOUNTER — OFFICE VISIT (OUTPATIENT)
Dept: PSYCHIATRY | Facility: CLINIC | Age: 27
End: 2022-04-21
Payer: COMMERCIAL

## 2022-04-21 DIAGNOSIS — F33.41 RECURRENT MAJOR DEPRESSIVE DISORDER, IN PARTIAL REMISSION: Primary | ICD-10-CM

## 2022-04-21 DIAGNOSIS — F41.1 GENERALIZED ANXIETY DISORDER: ICD-10-CM

## 2022-04-21 PROCEDURE — 90834 PSYTX W PT 45 MINUTES: CPT | Mod: S$GLB,,, | Performed by: SOCIAL WORKER

## 2022-04-21 PROCEDURE — 90834 PR PSYCHOTHERAPY W/PATIENT, 45 MIN: ICD-10-PCS | Mod: S$GLB,,, | Performed by: SOCIAL WORKER

## 2022-04-21 NOTE — PROGRESS NOTES
Individual Psychotherapy (PhD/LCSW)    4/21/2022     The patient location is: [place], [place]  The chief complaint leading to consultation is: depression    Visit type: audiovisual    Face to Face time with patient: 45 min  60 minutes of total time spent on the encounter, which includes face to face time and non-face to face time preparing to see the patient (eg, review of tests), Obtaining and/or reviewing separately obtained history, Documenting clinical information in the electronic or other health record, Independently interpreting results (not separately reported) and communicating results to the patient/family/caregiver, or Care coordination (not separately reported).         Each patient to whom he or she provides medical services by telemedicine is:  (1) informed of the relationship between the physician and patient and the respective role of any other health care provider with respect to management of the patient; and (2) notified that he or she may decline to receive medical services by telemedicine and may withdraw from such care at any time.    Notes:     Site:  [facility]         Therapeutic Intervention: Met with patient.  Outpatient - Insight oriented psychotherapy 45 min - CPT code 96439, Outpatient - Behavior modifying psychotherapy 45 min - CPT code 59124 and Outpatient - Supportive psychotherapy 45 min - CPT Code 90149    Chief complaint/reason for encounter: depression     Interval history and content of current session: Pt is a 26 year old single white female who presents today for a follow up therapy session. Pt reports that she is doing okay but continues to feel stressed about the end of the semester. Pt reports that she has three more assignments to get done before May 6th. Pt reports sleep has been a struggle as she has had a hard time falling asleep. Pt states that the new medication she was prescribed a few weeks ago helps her to stay asleep but she doesn't fall asleep until 3AM.  Pt states that she takes the medication around 9:30PM. Pt reports that she plans on reaching out to the psychiatrist regarding this issue. Pt also states that she has stopped smoking marijuana before bedtime and she believes this has also played a role in her sleep issues. Pt reports that mood remains in the middle. Pt denies feeling sad or happy and would like to feel a little bit more naseem. Pt states that Wellbutrin was increased a little bit ago. Pt reports binging has improved slightly as she is eating more food during the day. Pt and clinician talked about the chemical triggers in the brain that cause urges to binge as a way to destigmatize the issue. Pt advised to try and sit through the urges as they tend to pass after a few minutes. Pt aware that the longer she sits through urges, the less urges she will ultimately have.     Treatment plan:  · Target symptoms: depression, anxiety , work stress  · Why chosen therapy is appropriate versus another modality: relevant to diagnosis, patient responds to this modality, evidence based practice  · Outcome monitoring methods: self-report, observation  · Therapeutic intervention type: insight oriented psychotherapy, behavior modifying psychotherapy, supportive psychotherapy    Risk parameters:  Patient reports suicidal ideation: passive SI, denies active plan  Patient reports no homicidal ideation  Patient reports no self-injurious behavior  Patient reports no violent behavior    Verbal deficits: None    Patient's response to intervention:  The patient's response to intervention is accepting.    Progress toward goals and other mental status changes:  The patient's progress toward goals is excellent.    Diagnosis:     ICD-10-CM ICD-9-CM   1. Recurrent major depressive disorder, in partial remission  F33.41 296.35   2. Generalized anxiety disorder  F41.1 300.02       Plan:  individual psychotherapy and medication management by physician       Return to clinic: 1  week    Length of Service (minutes): 45

## 2022-04-28 ENCOUNTER — OFFICE VISIT (OUTPATIENT)
Dept: PSYCHIATRY | Facility: CLINIC | Age: 27
End: 2022-04-28
Payer: COMMERCIAL

## 2022-04-28 DIAGNOSIS — F33.41 RECURRENT MAJOR DEPRESSIVE DISORDER, IN PARTIAL REMISSION: Primary | ICD-10-CM

## 2022-04-28 DIAGNOSIS — F41.1 GENERALIZED ANXIETY DISORDER: ICD-10-CM

## 2022-04-28 PROCEDURE — 90834 PR PSYCHOTHERAPY W/PATIENT, 45 MIN: ICD-10-PCS | Mod: S$GLB,,, | Performed by: SOCIAL WORKER

## 2022-04-28 PROCEDURE — 90834 PSYTX W PT 45 MINUTES: CPT | Mod: S$GLB,,, | Performed by: SOCIAL WORKER

## 2022-04-28 NOTE — PROGRESS NOTES
Individual Psychotherapy (PhD/LCSW)    4/28/2022     The patient location is: [place], [place]  The chief complaint leading to consultation is: depression    Visit type: audiovisual    Face to Face time with patient: 45 min  60 minutes of total time spent on the encounter, which includes face to face time and non-face to face time preparing to see the patient (eg, review of tests), Obtaining and/or reviewing separately obtained history, Documenting clinical information in the electronic or other health record, Independently interpreting results (not separately reported) and communicating results to the patient/family/caregiver, or Care coordination (not separately reported).         Each patient to whom he or she provides medical services by telemedicine is:  (1) informed of the relationship between the physician and patient and the respective role of any other health care provider with respect to management of the patient; and (2) notified that he or she may decline to receive medical services by telemedicine and may withdraw from such care at any time.    Notes:     Site:  [facility]         Therapeutic Intervention: Met with patient.  Outpatient - Insight oriented psychotherapy 45 min - CPT code 82976, Outpatient - Behavior modifying psychotherapy 45 min - CPT code 46510 and Outpatient - Supportive psychotherapy 45 min - CPT Code 82248    Chief complaint/reason for encounter: depression     Interval history and content of current session: Pt is a 26 year old single white female who presents today for a follow up therapy session. Pt reports that she is having trouble with sleep. Pt states that she can't fall asleep until 3AM and then sleeps until 3 or 4pm if she has nothing scheduled. Pt reports that this makes it more difficult to be productive with finishing papers for final grades. Pt reports that's he plans on reaching out to her psychiatrist. Pt considering stopping the sleep medication to see if it  makes it easier for her to wake up in the morning. Pt to discuss process for doing this with psychiatrist. Pt has a scheduled appointment with him next week. Pt reports that she continues to work both jobs at [employer] and [employer]. Pt looking forward to graduation in a few weeks. She will be done with finals next week. Pt still struggling with eating enough during the day but is being more mindful of this. Pt planning on going to a coffee shop after today's session in order to finish one paper. Pt denying major depression or anxiety at this time and has not thought much about her break up with Geovanny.     Treatment plan:  · Target symptoms: depression, anxiety , work stress  · Why chosen therapy is appropriate versus another modality: relevant to diagnosis, patient responds to this modality, evidence based practice  · Outcome monitoring methods: self-report, observation  · Therapeutic intervention type: insight oriented psychotherapy, behavior modifying psychotherapy, supportive psychotherapy    Risk parameters:  Patient reports suicidal ideation: passive SI, denies active plan  Patient reports no homicidal ideation  Patient reports no self-injurious behavior  Patient reports no violent behavior    Verbal deficits: None    Patient's response to intervention:  The patient's response to intervention is accepting.    Progress toward goals and other mental status changes:  The patient's progress toward goals is excellent.    Diagnosis:     ICD-10-CM ICD-9-CM   1. Recurrent major depressive disorder, in partial remission  F33.41 296.35   2. Generalized anxiety disorder  F41.1 300.02       Plan:  individual psychotherapy and medication management by physician       Return to clinic: 1 week    Length of Service (minutes): 45

## 2022-05-05 ENCOUNTER — OFFICE VISIT (OUTPATIENT)
Dept: PSYCHIATRY | Facility: CLINIC | Age: 27
End: 2022-05-05
Payer: COMMERCIAL

## 2022-05-05 DIAGNOSIS — F41.1 GENERALIZED ANXIETY DISORDER: ICD-10-CM

## 2022-05-05 DIAGNOSIS — F43.10 PTSD (POST-TRAUMATIC STRESS DISORDER): ICD-10-CM

## 2022-05-05 DIAGNOSIS — F33.41 RECURRENT MAJOR DEPRESSIVE DISORDER, IN PARTIAL REMISSION: Primary | ICD-10-CM

## 2022-05-05 PROCEDURE — 99214 OFFICE O/P EST MOD 30 MIN: CPT | Mod: S$GLB,,, | Performed by: STUDENT IN AN ORGANIZED HEALTH CARE EDUCATION/TRAINING PROGRAM

## 2022-05-05 PROCEDURE — 99214 PR OFFICE/OUTPT VISIT, EST, LEVL IV, 30-39 MIN: ICD-10-PCS | Mod: S$GLB,,, | Performed by: STUDENT IN AN ORGANIZED HEALTH CARE EDUCATION/TRAINING PROGRAM

## 2022-05-05 RX ORDER — HYDROXYZINE HYDROCHLORIDE 25 MG/1
25-50 TABLET, FILM COATED ORAL NIGHTLY PRN
Qty: 60 TABLET | Refills: 0 | Status: SHIPPED | OUTPATIENT
Start: 2022-05-05

## 2022-05-05 RX ORDER — BUPROPION HYDROCHLORIDE 300 MG/1
300 TABLET ORAL DAILY
Qty: 30 TABLET | Refills: 0 | Status: SHIPPED | OUTPATIENT
Start: 2022-05-05 | End: 2022-06-04

## 2022-05-05 RX ORDER — TRAZODONE HYDROCHLORIDE 100 MG/1
50-100 TABLET ORAL NIGHTLY PRN
Qty: 30 TABLET | Refills: 0 | Status: SHIPPED | OUTPATIENT
Start: 2022-05-05 | End: 2022-06-04

## 2022-05-05 RX ORDER — BUSPIRONE HYDROCHLORIDE 10 MG/1
10 TABLET ORAL 3 TIMES DAILY
Qty: 90 TABLET | Refills: 0 | Status: SHIPPED | OUTPATIENT
Start: 2022-05-05

## 2022-05-05 NOTE — PROGRESS NOTES
OUTPATIENT PSYCHIATRY FOLLOW-UP VISIT    ENCOUNTER DATE:  5/5/2022  SITE:  [facility]      CHIEF COMPLAINT:   Depression    HISTORY OF PRESENTING ILLNESS:  Sarahy Moreira is a 26 y.o. female with history of PTSD who presents for follow up.       History as told by Patient - & or family/friend/spouse/caregiver with pts permission  Pt reports unable to fall asleep with doxepin, also tried resuming hydroxyzine to fall asleep but not as helpful as when taking alone. Pt also with excess sleep during late mornings. Has been studying for finals and doing well, will be graduating in coming weeks. Will be working at same places after graduation.  Denies significant depression except some low moments and anhedonic.  Some anxiety remains although not severe, pt still coping with some worry. Denies side effects to medication, tolerating buspirone.  OK with plan to address sleep until next visit, will then adjust wellbutrin or busprione as needed.         PSYCHIATRIC REVIEW OF SYSTEMS:(none/ yes- better/worse/stable/& what symptoms)    Symptoms of Depression:  Still present, mostly low energy and anhedonia    Symptoms of Anxiety/ panic attacks: minor improvement    Symptoms of Emely/Hypomania:  Ups associated with then drinking and cocaine use.    Symptoms of psychosis:  none    Sleep:   Interrupted sleep, and poor sleep onset    Appetite:  Low or high, not in middle    Alcohol:  1x per week    Illicit Drugs:  Cutting back on marijuana, now used recreationally only. Denies recent cocaine use.     Psychosocial stressors:  Work, relationships    Risk Parameters:  Patient reports no suicidal ideation  Patient reports no homicidal ideation  Patient reports no self-injurious behavior  Patient reports no violent behavior    PSYCHIATRIC MED REVIEW  Nortriptyline, gabapentin  Hx effexor  Lexapro 10mg not efficacious    Current psych meds  Medication side effects:  none  Medication compliance:   yes    Previous psych meds trials  Notriptyline, gabapentin. Hx effexor    MEDICAL REVIEW OF SYSTEMS:  Complete review of systems performed covering Constitutional, Cardiovascular, Respiratory, Gastrointestinal, Musculoskeletal, Skin, Neurologic and Endocrine  All systems negative/ except for  Headaches.     MEDICAL HISTORY:  No past medical history on file.    ALL MEDICATIONS:    Current Outpatient Medications:     atogepant (QULIPTA) 60 mg Tab, Take 60 mg by mouth once daily., Disp: 30 tablet, Rfl: 5    buPROPion (WELLBUTRIN XL) 300 MG 24 hr tablet, Take 1 tablet (300 mg total) by mouth once daily., Disp: 30 tablet, Rfl: 0    busPIRone (BUSPAR) 10 MG tablet, Take 1 tablet (10 mg total) by mouth 3 (three) times daily., Disp: 90 tablet, Rfl: 0    gabapentin (NEURONTIN) 300 MG capsule, Take 2 capsules (600 mg total) by mouth 3 (three) times daily., Disp: 180 capsule, Rfl: 11    hydrOXYzine HCL (ATARAX) 25 MG tablet, Take 1-2 tablets (25-50 mg total) by mouth nightly as needed (sleep)., Disp: 60 tablet, Rfl: 0    norethindrone (MICRONOR) 0.35 mg tablet, , Disp: , Rfl:     traZODone (DESYREL) 100 MG tablet, Take 0.5-1 tablets ( mg total) by mouth nightly as needed for Insomnia., Disp: 30 tablet, Rfl: 0    ALLERGIES:  Review of patient's allergies indicates:  No Known Allergies    RELEVANT LABS/STUDIES:    No results found for: WBC, HGB, HCT, MCV, PLT  BMP  No results found for: NA, K, CL, CO2, BUN, CREATININE, CALCIUM, ANIONGAP, ESTGFRAFRICA, EGFRNONAA  No results found for: ALT, AST, GGT, ALKPHOS, BILITOT  No results found for: TSH  No results found for: LABA1C, HGBA1C      VITALS  There were no vitals filed for this visit.    PHYSICAL EXAM  General: well developed, well nourished  Neurologic:   Gait: Normal   Psychomotor signs:  No involuntary movements or tremor  AIMS: none    PSYCHIATRIC EXAM:    Mental Status Exam:  Appearance: unremarkable, age appropriate  Behavior/Cooperation: limited/ appropriate  "normal, cooperative  Speech:  normal tone, normal rate, normal pitch, normal volume  Language: uses words appropriately; NO aphasia or dysarthria  Mood: "naomy"  Affect:  constricted, otherwise reactive, appropriate  Thought Process: normal and logical  Thought Content: normal, no suicidality, no homicidality, delusions, or paranoia  Level of Consciousness: Alert and Oriented x3  Memory:  Intact  Attention/concentration: appropriate for age/education.   Fund of Knowledge: appears adequate  Insight:  Intact  Judgment: Intact       IMPRESSION:    Sarahy Moreira is a 26 y.o. female with history of PTSD who presents for follow up. Symptoms of MDD and MONCHO.   Unclear if previously has had true hypomanic episodes or if variation of normal and/or fueled by caffeine, cannabis, cocaine use. Discussed diagnoses with pt, agrees to plan for trial of antidepressants and monitoring response and for manic symptoms, and to call if experiencing manic symptoms/episode.     DIAGNOSES:    ICD-10-CM ICD-9-CM   1. Recurrent major depressive disorder, in partial remission  F33.41 296.35   2. Generalized anxiety disorder  F41.1 300.02   3. PTSD (post-traumatic stress disorder)  F43.10 309.81   Cocaine, alcohol and cannabis use.  Rule out Bipolar 2 disorder, mre depression    PLAN:  Psych Med:     Discontinue doxepin 10mg nightly for insomnia due to poor sleep onset and excess late morning sleep.    Start trazodone 50-100mg nightly prn for insomnia.   Continue hydroyxizne 25-50mg nightly prn for sleep onset if trazodone is not effective.    Continue wellbutrin XL 300mg .  Consider increasing on future visits if needed for anhedonia.    Continue buspirone 10mg tid for anxiety, consider increasing on future visits if needed for anxiety symptoms.     Continue psychotherapy     Discussed with patient informed consent, risks vs. benefits, alternative treatments, side effect profile and the inherent unpredictability of individual responses to " these treatments. Answered any questions patient may have had. The patient expresses understanding of the above and displays the capacity to agree with this current plan     Other:     RETURN TO CLINIC: 1 month    Nicholas Molina MD  [facility], PGY-3  05/05/2022

## 2022-05-09 ENCOUNTER — OFFICE VISIT (OUTPATIENT)
Dept: PSYCHIATRY | Facility: CLINIC | Age: 27
End: 2022-05-09
Payer: COMMERCIAL

## 2022-05-09 DIAGNOSIS — F41.1 GENERALIZED ANXIETY DISORDER: ICD-10-CM

## 2022-05-09 DIAGNOSIS — F33.41 RECURRENT MAJOR DEPRESSIVE DISORDER, IN PARTIAL REMISSION: Primary | ICD-10-CM

## 2022-05-09 PROCEDURE — 90834 PR PSYCHOTHERAPY W/PATIENT, 45 MIN: ICD-10-PCS | Mod: S$GLB,,, | Performed by: SOCIAL WORKER

## 2022-05-09 PROCEDURE — 90834 PSYTX W PT 45 MINUTES: CPT | Mod: S$GLB,,, | Performed by: SOCIAL WORKER

## 2022-05-09 NOTE — PROGRESS NOTES
Individual Psychotherapy (PhD/LCSW)    5/9/2022     The patient location is: [place], [place]  The chief complaint leading to consultation is: depression    Visit type: audiovisual    Face to Face time with patient: 45 min  60 minutes of total time spent on the encounter, which includes face to face time and non-face to face time preparing to see the patient (eg, review of tests), Obtaining and/or reviewing separately obtained history, Documenting clinical information in the electronic or other health record, Independently interpreting results (not separately reported) and communicating results to the patient/family/caregiver, or Care coordination (not separately reported).         Each patient to whom he or she provides medical services by telemedicine is:  (1) informed of the relationship between the physician and patient and the respective role of any other health care provider with respect to management of the patient; and (2) notified that he or she may decline to receive medical services by telemedicine and may withdraw from such care at any time.    Notes:     Site:  [facility]         Therapeutic Intervention: Met with patient.  Outpatient - Insight oriented psychotherapy 45 min - CPT code 86051, Outpatient - Behavior modifying psychotherapy 45 min - CPT code 93433 and Outpatient - Supportive psychotherapy 45 min - CPT Code 38832    Chief complaint/reason for encounter: depression     Interval history and content of current session: Pt is a 26 year old single white female who presents today for a follow up therapy session. Pt reports that she was prescribed Trazodone at recent visit last Thurs. Per medical record, prescriber discontinuing Doxepin although continued to prescribe hydroxyzine if trazodone not working well alone. Pt reports that she continues to struggle with ADLs (forgets to shower until she realizes it 3 days later). Pt reports that she still forgets to eat. Pt struggles with making  decisions about what to eat and then doesn't end up eating. Pt states that she did finish her semester and is set to graduate on May 21st. Pt does endorse looking forward to doing fun things with friends and exploring more of [place] on her own. Pt planning on cleaning her house today but also has a long week of working until Sunday. Pt hoping that she will feel more motivated and energized over the next few weeks now that school has formally ended.     Treatment plan:  · Target symptoms: depression, anxiety , work stress  · Why chosen therapy is appropriate versus another modality: relevant to diagnosis, patient responds to this modality, evidence based practice  · Outcome monitoring methods: self-report, observation  · Therapeutic intervention type: insight oriented psychotherapy, behavior modifying psychotherapy, supportive psychotherapy    Risk parameters:  Patient reports suicidal ideation: passive SI, denies active plan  Patient reports no homicidal ideation  Patient reports no self-injurious behavior  Patient reports no violent behavior    Verbal deficits: None    Patient's response to intervention:  The patient's response to intervention is accepting.    Progress toward goals and other mental status changes:  The patient's progress toward goals is excellent.    Diagnosis:     ICD-10-CM ICD-9-CM   1. Recurrent major depressive disorder, in partial remission  F33.41 296.35   2. Generalized anxiety disorder  F41.1 300.02       Plan:  individual psychotherapy and medication management by physician       Return to clinic: 1 week    Length of Service (minutes): 45

## 2022-06-06 ENCOUNTER — OFFICE VISIT (OUTPATIENT)
Dept: PSYCHIATRY | Facility: CLINIC | Age: 27
End: 2022-06-06
Payer: COMMERCIAL

## 2022-06-06 DIAGNOSIS — F33.41 RECURRENT MAJOR DEPRESSIVE DISORDER, IN PARTIAL REMISSION: Primary | ICD-10-CM

## 2022-06-06 DIAGNOSIS — F41.1 GENERALIZED ANXIETY DISORDER: ICD-10-CM

## 2022-06-06 PROCEDURE — 90834 PR PSYCHOTHERAPY W/PATIENT, 45 MIN: ICD-10-PCS | Mod: S$GLB,,, | Performed by: SOCIAL WORKER

## 2022-06-06 PROCEDURE — 90834 PSYTX W PT 45 MINUTES: CPT | Mod: S$GLB,,, | Performed by: SOCIAL WORKER

## 2022-06-06 NOTE — PROGRESS NOTES
Individual Psychotherapy (PhD/LCSW)    6/6/2022       Site:  [facility]         Therapeutic Intervention: Met with patient.  Outpatient - Insight oriented psychotherapy 45 min - CPT code 75465, Outpatient - Behavior modifying psychotherapy 45 min - CPT code 08449 and Outpatient - Supportive psychotherapy 45 min - CPT Code 70177    Chief complaint/reason for encounter: depression     Interval history and content of current session: Pt is a 26 year old single white female who presents today for a follow up therapy session. Pt reports that mood and anxiety improved since graduating with MPH. Pt reports that she still doesn't feel a lot of excitement about things but is less overwhelmed. Pt states that she had a good visit with her parents and enjoyed going with them to [place].  Pt reports going to [place] next week and is looking forward to this. Pt reports still working for [employer] but has applied to a few professional jobs, one of which is located in [place], [place]. Pt reports that she has an interview for this one tomorrow. Pt reports that sleep is still off. Pt reports that she sleep from 5AM-2PM and plans on discussing the issue with psychiatrist when she returns from her trip. Pt reports that she is talking to someone that she met on [place] and has spent the night where he is living in [place]. Pt reports that he is a medical resident and enjoys his company. Pt's affect brighter and pt more talkative today than in previous session. Pt following up next month.     Treatment plan:  · Target symptoms: depression, anxiety , work stress  · Why chosen therapy is appropriate versus another modality: relevant to diagnosis, patient responds to this modality, evidence based practice  · Outcome monitoring methods: self-report, observation  · Therapeutic intervention type: insight oriented psychotherapy, behavior modifying psychotherapy, supportive psychotherapy    Risk parameters:  Patient  reports suicidal ideation: passive SI, denies active plan  Patient reports no homicidal ideation  Patient reports no self-injurious behavior  Patient reports no violent behavior    Verbal deficits: None    Patient's response to intervention:  The patient's response to intervention is accepting.    Progress toward goals and other mental status changes:  The patient's progress toward goals is excellent.    Diagnosis:     ICD-10-CM ICD-9-CM   1. Recurrent major depressive disorder, in partial remission  F33.41 296.35   2. Generalized anxiety disorder  F41.1 300.02       Plan:  individual psychotherapy and medication management by physician       Return to clinic: 1 month    Length of Service (minutes): 45

## 2022-07-06 ENCOUNTER — OFFICE VISIT (OUTPATIENT)
Dept: PSYCHIATRY | Facility: CLINIC | Age: 27
End: 2022-07-06
Payer: COMMERCIAL

## 2022-07-06 DIAGNOSIS — F43.10 PTSD (POST-TRAUMATIC STRESS DISORDER): ICD-10-CM

## 2022-07-06 DIAGNOSIS — F41.1 GENERALIZED ANXIETY DISORDER: ICD-10-CM

## 2022-07-06 DIAGNOSIS — F33.41 RECURRENT MAJOR DEPRESSIVE DISORDER, IN PARTIAL REMISSION: Primary | ICD-10-CM

## 2022-07-06 PROCEDURE — 90834 PR PSYCHOTHERAPY W/PATIENT, 45 MIN: ICD-10-PCS | Mod: S$GLB,,, | Performed by: SOCIAL WORKER

## 2022-07-06 PROCEDURE — 90834 PSYTX W PT 45 MINUTES: CPT | Mod: S$GLB,,, | Performed by: SOCIAL WORKER

## 2022-07-06 NOTE — PROGRESS NOTES
Individual Psychotherapy (PhD/LCSW)    7/6/2022       Site:  [facility]         Therapeutic Intervention: Met with patient.  Outpatient - Insight oriented psychotherapy 45 min - CPT code 67248, Outpatient - Behavior modifying psychotherapy 45 min - CPT code 21850 and Outpatient - Supportive psychotherapy 45 min - CPT Code 72204    Chief complaint/reason for encounter: depression     Interval history and content of current session: Pt is a 26 year old single white female who presents today for a follow up therapy session. Pt appearing tired this morning, states that she has been sleeping a lot over the last few days. Pt endorses that she has always struggled falling asleep at night, and typically has nights and days switched.  She reports that the medications she is on are not helping her to fall asleep. Pt seeing a new resident psychiatrist on 7/13 for follow up. Pt states that she believes she may be depressed due to pressure of feeling like a disappointment to her parents. Pt struggling to feel motivated when it comes to cleaning her apartment and finding a job. 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 Pt tearful at times talking about her thoughts and feelings of always feeling like a burden to her parents. Pt states that her brother was treated differently despite the fact that she excelled in school. Clinician and pt discussed the idea of shifting the narrative from one based in negative self talk to one of resilience. Pt to work on writing down the beginning story to the reframed narrative. Pt to bring with her to next session. Clinician provided pt with examples where pt has shown strength and resilience.     Treatment plan:  · Target symptoms: depression, anxiety , work stress  · Why chosen therapy is appropriate versus another modality: relevant to diagnosis, patient responds to this modality, evidence based practice  · Outcome monitoring methods: self-report,  observation  · Therapeutic intervention type: insight oriented psychotherapy, behavior modifying psychotherapy, supportive psychotherapy    Risk parameters:  Patient reports suicidal ideation: passive SI, denies active plan  Patient reports no homicidal ideation  Patient reports no self-injurious behavior  Patient reports no violent behavior    Verbal deficits: None    Patient's response to intervention:  The patient's response to intervention is accepting.    Progress toward goals and other mental status changes:  The patient's progress toward goals is excellent.    Diagnosis:     ICD-10-CM ICD-9-CM   1. Recurrent major depressive disorder, in partial remission  F33.41 296.35   2. Generalized anxiety disorder  F41.1 300.02   3. PTSD (post-traumatic stress disorder)  F43.10 309.81       Plan:  individual psychotherapy and medication management by physician       Return to clinic: 1 month    Length of Service (minutes): 45

## 2022-07-11 ENCOUNTER — OFFICE VISIT (OUTPATIENT)
Dept: PSYCHIATRY | Facility: CLINIC | Age: 27
End: 2022-07-11
Payer: COMMERCIAL

## 2022-07-11 DIAGNOSIS — F33.41 RECURRENT MAJOR DEPRESSIVE DISORDER, IN PARTIAL REMISSION: Primary | ICD-10-CM

## 2022-07-11 DIAGNOSIS — F41.1 GENERALIZED ANXIETY DISORDER: ICD-10-CM

## 2022-07-11 PROCEDURE — 90834 PSYTX W PT 45 MINUTES: CPT | Mod: S$GLB,,, | Performed by: SOCIAL WORKER

## 2022-07-11 PROCEDURE — 90834 PR PSYCHOTHERAPY W/PATIENT, 45 MIN: ICD-10-PCS | Mod: S$GLB,,, | Performed by: SOCIAL WORKER

## 2022-07-11 NOTE — PROGRESS NOTES
Individual Psychotherapy (PhD/LCSW)    7/11/2022       Site:  [facility]         Therapeutic Intervention: Met with patient.  Outpatient - Insight oriented psychotherapy 45 min - CPT code 14758, Outpatient - Behavior modifying psychotherapy 45 min - CPT code 95827 and Outpatient - Supportive psychotherapy 45 min - CPT Code 24346    Chief complaint/reason for encounter: depression     Interval history and content of current session: Pt is a 26 year old single white female who presents today for a follow up therapy session. Pt appearing tired this morning but more engaged in today's session. Clinician and pt reviewed the last time pt felt fulfilled or joyful. Pt reports that she has never felt fully joyful/fulfilled but has experienced fleeting thoughts. Pt reviewed job applications and states she may interview for a temporary position at an organization in [place], [place]. Pt and clinician reviewed pros and cons of moving to [place] versus staying in [place] or moving to a climate that is less likely to increase isolation. Pt and clinician discussed importance of processing relationship with each other, making sure to check in about how therapy is going. Pt to continue focusing on reframing her narrative.       Treatment plan:  · Target symptoms: depression, anxiety , work stress  · Why chosen therapy is appropriate versus another modality: relevant to diagnosis, patient responds to this modality, evidence based practice  · Outcome monitoring methods: self-report, observation  · Therapeutic intervention type: insight oriented psychotherapy, behavior modifying psychotherapy, supportive psychotherapy    Risk parameters:  Patient reports suicidal ideation: passive SI, denies active plan  Patient reports no homicidal ideation  Patient reports no self-injurious behavior  Patient reports no violent behavior    Verbal deficits: None    Patient's response to intervention:  The patient's response to intervention is  accepting.    Progress toward goals and other mental status changes:  The patient's progress toward goals is excellent.    Diagnosis:     ICD-10-CM ICD-9-CM   1. Recurrent major depressive disorder, in partial remission  F33.41 296.35   2. Generalized anxiety disorder  F41.1 300.02       Plan:  individual psychotherapy and medication management by physician       Return to clinic: 1 week    Length of Service (minutes): 45

## 2022-07-13 ENCOUNTER — OFFICE VISIT (OUTPATIENT)
Dept: PSYCHIATRY | Facility: CLINIC | Age: 27
End: 2022-07-13
Payer: COMMERCIAL

## 2022-07-13 VITALS
HEART RATE: 57 BPM | SYSTOLIC BLOOD PRESSURE: 131 MMHG | WEIGHT: 200.75 LBS | DIASTOLIC BLOOD PRESSURE: 87 MMHG | BODY MASS INDEX: 34.46 KG/M2

## 2022-07-13 DIAGNOSIS — F32.1 CURRENT MODERATE EPISODE OF MAJOR DEPRESSIVE DISORDER, UNSPECIFIED WHETHER RECURRENT: Primary | ICD-10-CM

## 2022-07-13 DIAGNOSIS — F41.1 GENERALIZED ANXIETY DISORDER: ICD-10-CM

## 2022-07-13 DIAGNOSIS — F43.10 PTSD (POST-TRAUMATIC STRESS DISORDER): ICD-10-CM

## 2022-07-13 PROCEDURE — 99213 OFFICE O/P EST LOW 20 MIN: CPT | Mod: S$GLB,,, | Performed by: STUDENT IN AN ORGANIZED HEALTH CARE EDUCATION/TRAINING PROGRAM

## 2022-07-13 PROCEDURE — 99213 PR OFFICE/OUTPT VISIT, EST, LEVL III, 20-29 MIN: ICD-10-PCS | Mod: S$GLB,,, | Performed by: STUDENT IN AN ORGANIZED HEALTH CARE EDUCATION/TRAINING PROGRAM

## 2022-07-13 RX ORDER — BUPROPION HYDROCHLORIDE 300 MG/1
300 TABLET ORAL DAILY
Qty: 30 TABLET | Refills: 0 | Status: SHIPPED | OUTPATIENT
Start: 2022-07-13 | End: 2022-08-10 | Stop reason: SDUPTHER

## 2022-07-13 RX ORDER — MIRTAZAPINE 15 MG/1
15 TABLET, FILM COATED ORAL NIGHTLY
Qty: 30 TABLET | Refills: 0 | Status: SHIPPED | OUTPATIENT
Start: 2022-07-13 | End: 2022-08-10 | Stop reason: SDUPTHER

## 2022-07-13 RX ORDER — BUSPIRONE HYDROCHLORIDE 10 MG/1
10 TABLET ORAL 3 TIMES DAILY
Qty: 90 TABLET | Refills: 0 | Status: SHIPPED | OUTPATIENT
Start: 2022-07-13 | End: 2022-08-12

## 2022-07-13 NOTE — PROGRESS NOTES
OUTPATIENT PSYCHIATRY FOLLOW-UP VISIT    ENCOUNTER DATE:  7/13/2022  SITE:  [facility]      CHIEF COMPLAINT:   Depression    HISTORY OF PRESENTING ILLNESS:  Sarahy Moreira is a 26 y.o. female with history of PTSD who presents for follow up.       History as told by Patient - & or family/friend/spouse/caregiver with pts permission  Pt reports being treated for MDD and insomnia, PTSD, MONCHO. Reports things have been about the same since last appointment. Major issue is falling asleep. No trouble with staying asleep. Sleep onset insomnia has been a problem for the majority of her life. Finds that her thoughts are racing at night. Has tried benadryl, hydroxyzine, trazodone, CBD oil, doxepin, melatonin. Mood has been more down than up over the past month. Currently working at [employer]. Graduated in May with an MPH. Having trouble with job hunting. Feels that anhedonia is pretty much the same. Taking medications as prescribed from neurologist. Has had some intermittent passive suicidal ideation but has never acted on these thoughts.      PSYCHIATRIC REVIEW OF SYSTEMS:(none/ yes- better/worse/stable/& what symptoms)    Symptoms of Depression:  Still present, mostly low energy and anhedonia    Symptoms of Anxiety/ panic attacks: minor improvement    Symptoms of Emely/Hypomania:  Ups associated with then drinking and cocaine use.    Symptoms of psychosis:  none    Sleep:   Interrupted sleep, and poor sleep onset    Appetite:  Low or high, not in middle    Alcohol:  1x per week    Illicit Drugs: Recreationally only. Denies recent cocaine use.     Psychosocial stressors:  Work, relationships    Risk Parameters:  Patient reports no suicidal ideation  Patient reports no homicidal ideation  Patient reports no self-injurious behavior  Patient reports no violent behavior    PSYCHIATRIC MED REVIEW  Nortriptyline  Lexapro 10mg not efficacious and caused mood swings    Current psych meds  Medication  side effects:  none  Medication compliance:  yes    Previous psych meds trials  Lexapro, gabapentin    MEDICAL REVIEW OF SYSTEMS:  Complete review of systems performed covering Constitutional, Cardiovascular, Respiratory, Gastrointestinal, Musculoskeletal, Skin, Neurologic and Endocrine  All systems negative/ except for  Headaches.     MEDICAL HISTORY:  No past medical history on file.    ALL MEDICATIONS:    Current Outpatient Medications:     atogepant (QULIPTA) 60 mg Tab, Take 60 mg by mouth once daily., Disp: 30 tablet, Rfl: 5    buPROPion (WELLBUTRIN XL) 300 MG 24 hr tablet, Take 1 tablet (300 mg total) by mouth once daily., Disp: 30 tablet, Rfl: 0    busPIRone (BUSPAR) 10 MG tablet, Take 1 tablet (10 mg total) by mouth 3 (three) times daily., Disp: 90 tablet, Rfl: 0    gabapentin (NEURONTIN) 300 MG capsule, Take 2 capsules (600 mg total) by mouth 3 (three) times daily., Disp: 180 capsule, Rfl: 11    hydrOXYzine HCL (ATARAX) 25 MG tablet, Take 1-2 tablets (25-50 mg total) by mouth nightly as needed (sleep)., Disp: 60 tablet, Rfl: 0    norethindrone (MICRONOR) 0.35 mg tablet, , Disp: , Rfl:     traZODone (DESYREL) 100 MG tablet, Take 0.5-1 tablets ( mg total) by mouth nightly as needed for Insomnia., Disp: 30 tablet, Rfl: 0    ALLERGIES:  Review of patient's allergies indicates:  No Known Allergies    RELEVANT LABS/STUDIES:    No results found for: WBC, HGB, HCT, MCV, PLT  BMP  No results found for: NA, K, CL, CO2, BUN, CREATININE, CALCIUM, ANIONGAP, ESTGFRAFRICA, EGFRNONAA  No results found for: ALT, AST, GGT, ALKPHOS, BILITOT  No results found for: TSH  No results found for: LABA1C, HGBA1C      VITALS  /65      PHYSICAL EXAM  General: well developed, well nourished  Neurologic:   Gait: Normal   Psychomotor signs:  No involuntary movements or tremor  AIMS: none    PSYCHIATRIC EXAM:    Mental Status Exam:  Appearance: unremarkable, age appropriate  Behavior/Cooperation: limited/ appropriate  "normal, cooperative  Speech:  normal tone, normal rate, normal pitch, normal volume  Language: uses words appropriately; NO aphasia or dysarthria  Mood: "up and down"  Affect:  constricted, otherwise reactive, appropriate  Thought Process: normal and logical  Thought Content: normal, no suicidality, no homicidality, delusions, or paranoia  Level of Consciousness: Alert and Oriented x3  Memory:  Intact  Attention/concentration: appropriate for age/education.   Fund of Knowledge: appears adequate  Insight:  Intact  Judgment: Intact       IMPRESSION:    Sarahy Moreira is a 26 y.o. female with history of PTSD who presents for follow up. Symptoms of MDD and MONCHO.   Unclear if previously has had true hypomanic episodes or if variation of normal and/or fueled by caffeine, cannabis, cocaine use. Discussed diagnoses with pt, agrees to plan for trial of antidepressants and monitoring response and for manic symptoms, and to call if experiencing manic symptoms/episode.      DIAGNOSES:    ICD-10-CM ICD-9-CM   1. Current moderate episode of major depressive disorder, unspecified whether recurrent  F32.1 296.22   2. PTSD (post-traumatic stress disorder)  F43.10 309.81   3. Generalized anxiety disorder  F41.1 300.02   Cocaine, alcohol and cannabis use.  Rule out Bipolar 2 disorder, mre depression    PLAN:  Psych Med:     Discontinue trazodone 50-100mg nightly prn for insomnia.   Discontinue hydroyxizne 25-50mg nightly prn for sleep onset if trazodone is not effective.    Continue wellbutrin XL 300mg .  Consider increasing on future visits if needed for anhedonia.    Continue buspirone 10mg tid for anxiety, consider increasing on future visits if needed for anxiety symptoms.    Start Remeron 15mg nightly. Can increase further in the future if effective for mood.    Continue psychotherapy     Discussed with patient informed consent, risks vs. benefits, alternative treatments, side effect profile and the inherent " unpredictability of individual responses to these treatments. Answered any questions patient may have had. The patient expresses understanding of the above and displays the capacity to agree with this current plan     Other:      RETURN TO CLINIC: 1 month    Branden Morales MD  [facility], PGY-3  07/13/2022

## 2022-07-13 NOTE — PROGRESS NOTES
STAFF COMMENTS: I have discussed pt with Dr. Morales and reviewed the history and exam. I agree and concur with the assessment and plan.

## 2022-07-20 ENCOUNTER — OFFICE VISIT (OUTPATIENT)
Dept: PSYCHIATRY | Facility: CLINIC | Age: 27
End: 2022-07-20
Payer: COMMERCIAL

## 2022-07-20 DIAGNOSIS — F41.1 GENERALIZED ANXIETY DISORDER: ICD-10-CM

## 2022-07-20 DIAGNOSIS — F32.1 CURRENT MODERATE EPISODE OF MAJOR DEPRESSIVE DISORDER, UNSPECIFIED WHETHER RECURRENT: ICD-10-CM

## 2022-07-20 DIAGNOSIS — F43.10 PTSD (POST-TRAUMATIC STRESS DISORDER): Primary | ICD-10-CM

## 2022-07-20 PROCEDURE — 90834 PSYTX W PT 45 MINUTES: CPT | Mod: S$GLB,,, | Performed by: SOCIAL WORKER

## 2022-07-20 PROCEDURE — 90834 PR PSYCHOTHERAPY W/PATIENT, 45 MIN: ICD-10-PCS | Mod: S$GLB,,, | Performed by: SOCIAL WORKER

## 2022-07-20 NOTE — PROGRESS NOTES
Individual Psychotherapy (PhD/LCSW)    7/20/2022       Site:  [facility]         Therapeutic Intervention: Met with patient.  Outpatient - Insight oriented psychotherapy 45 min - CPT code 02973, Outpatient - Behavior modifying psychotherapy 45 min - CPT code 05199 and Outpatient - Supportive psychotherapy 45 min - CPT Code 92497    Chief complaint/reason for encounter: depression     Interval history and content of current session: Pt is a 26 year old single white female who presents today for a follow up therapy session. Pt appearing lethargic, difficult to engage with flat affect. Pt reports that she saw the new psychiatric resident last week and is now on Remeron to help with sleep. 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 Pt tearful at times when discussing possibility of moving back to [place] temporarily to stay with parents while she saves money. Pt reports that it triggers feelings of being a failure as pt thrives on being financially independent. Pt describes being back at home as difficult due to parents' rules and the feeling that she reverts back to a 13 year old because of this. 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 Pt struggling with identifying her needs. Pt reports that it is difficult to find time to identify needs due to always being on the go and being in survival mode. Pt reports that finances are currently a stressor due to having to spend 2K on car repairs and recently going to a summer festival in [place].     Homework: Clinician asking pt to make an effort to think about her needs even if they cannot be met currently     Treatment plan:  · Target symptoms: depression, anxiety , work stress  · Why chosen therapy is appropriate versus another modality: relevant to  diagnosis, patient responds to this modality, evidence based practice  · Outcome monitoring methods: self-report, observation  · Therapeutic intervention type: insight oriented psychotherapy, behavior modifying psychotherapy, supportive psychotherapy    Risk parameters:  Patient reports suicidal ideation: passive SI, denies active plan  Patient reports no homicidal ideation  Patient reports no self-injurious behavior  Patient reports no violent behavior    Verbal deficits: None    Patient's response to intervention:  The patient's response to intervention is accepting.    Progress toward goals and other mental status changes:  The patient's progress toward goals is excellent.    Diagnosis:     ICD-10-CM ICD-9-CM   1. PTSD (post-traumatic stress disorder)  F43.10 309.81   2. Current moderate episode of major depressive disorder, unspecified whether recurrent  F32.1 296.22   3. Generalized anxiety disorder  F41.1 300.02       Plan:  individual psychotherapy and medication management by physician       Return to clinic: 1 week    Length of Service (minutes): 45

## 2022-08-02 ENCOUNTER — OFFICE VISIT (OUTPATIENT)
Dept: PSYCHIATRY | Facility: CLINIC | Age: 27
End: 2022-08-02
Payer: COMMERCIAL

## 2022-08-02 DIAGNOSIS — F41.1 GENERALIZED ANXIETY DISORDER: ICD-10-CM

## 2022-08-02 DIAGNOSIS — F32.1 CURRENT MODERATE EPISODE OF MAJOR DEPRESSIVE DISORDER, UNSPECIFIED WHETHER RECURRENT: Primary | ICD-10-CM

## 2022-08-02 PROCEDURE — 90834 PSYTX W PT 45 MINUTES: CPT | Mod: S$GLB,,, | Performed by: SOCIAL WORKER

## 2022-08-02 PROCEDURE — 90834 PR PSYCHOTHERAPY W/PATIENT, 45 MIN: ICD-10-PCS | Mod: S$GLB,,, | Performed by: SOCIAL WORKER

## 2022-08-02 NOTE — PROGRESS NOTES
Individual Psychotherapy (PhD/LCSW)    8/2/2022       Site:  [facility]         Therapeutic Intervention: Met with patient.  Outpatient - Insight oriented psychotherapy 45 min - CPT code 39729, Outpatient - Behavior modifying psychotherapy 45 min - CPT code 19455 and Outpatient - Supportive psychotherapy 45 min - CPT Code 08468    Chief complaint/reason for encounter: depression     Interval history and content of current session: Pt is a 26 year old single white female who presents today for a follow up therapy session. Pt more alert today. Pt states that she had a difficult week last week. Pt reports isolating at home for three days straight without engaging with anyone. Pt and clinician reviewed trigger for this. Pt states that she got drunk on Monday night with friends but left with a male former coworker and never alerted her friends that she was leaving. Pt states that she left her phone at the bar and went back to his place. Pt reports that when she picked up her phone the next day, she had multiple concerned texts from friends. Pt states that she felt ashamed and guilty which triggered anxiety and depression, hence the withdrawn behavior. Pt reports that she has since reached back out and is feeling better this week. 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 Pt appears excited about this. Pt appears to have difficulty with awareness around thoughts and feelings and would benefit from increasing awareness. Pt does admit that her anxiety makes it harder for her to feel motivated and spirals her into a depression. Pt open to doing MONCHO Manual starting next session.     Homework: Pt to review Intro to MONCHO Workbook and answer questions about abnormal vs normal worry    Treatment plan:  · Target symptoms: depression, anxiety , work stress  · Why chosen therapy is appropriate versus another modality: relevant to diagnosis, patient responds to this  modality, evidence based practice  · Outcome monitoring methods: self-report, observation  · Therapeutic intervention type: insight oriented psychotherapy, behavior modifying psychotherapy, supportive psychotherapy    Risk parameters:  Patient reports suicidal ideation: passive SI, denies active plan  Patient reports no homicidal ideation  Patient reports no self-injurious behavior  Patient reports no violent behavior    Verbal deficits: None    Patient's response to intervention:  The patient's response to intervention is accepting.    Progress toward goals and other mental status changes:  The patient's progress toward goals is excellent.    Diagnosis:     ICD-10-CM ICD-9-CM   1. Current moderate episode of major depressive disorder, unspecified whether recurrent  F32.1 296.22   2. Generalized anxiety disorder  F41.1 300.02       Plan:  individual psychotherapy and medication management by physician       Return to clinic: 1 week    Length of Service (minutes): 45

## 2022-08-10 ENCOUNTER — OFFICE VISIT (OUTPATIENT)
Dept: PSYCHIATRY | Facility: CLINIC | Age: 27
End: 2022-08-10
Payer: COMMERCIAL

## 2022-08-10 VITALS
HEART RATE: 59 BPM | DIASTOLIC BLOOD PRESSURE: 72 MMHG | SYSTOLIC BLOOD PRESSURE: 116 MMHG | BODY MASS INDEX: 35.72 KG/M2 | WEIGHT: 208.13 LBS

## 2022-08-10 DIAGNOSIS — F41.1 GENERALIZED ANXIETY DISORDER: ICD-10-CM

## 2022-08-10 DIAGNOSIS — F43.10 PTSD (POST-TRAUMATIC STRESS DISORDER): ICD-10-CM

## 2022-08-10 DIAGNOSIS — F32.1 CURRENT MODERATE EPISODE OF MAJOR DEPRESSIVE DISORDER, UNSPECIFIED WHETHER RECURRENT: Primary | ICD-10-CM

## 2022-08-10 PROCEDURE — 3078F DIAST BP <80 MM HG: CPT | Mod: CPTII,S$GLB,, | Performed by: STUDENT IN AN ORGANIZED HEALTH CARE EDUCATION/TRAINING PROGRAM

## 2022-08-10 PROCEDURE — 3008F BODY MASS INDEX DOCD: CPT | Mod: CPTII,S$GLB,, | Performed by: STUDENT IN AN ORGANIZED HEALTH CARE EDUCATION/TRAINING PROGRAM

## 2022-08-10 PROCEDURE — 99999 PR PBB SHADOW E&M-EST. PATIENT-LVL II: CPT | Mod: PBBFAC,,, | Performed by: STUDENT IN AN ORGANIZED HEALTH CARE EDUCATION/TRAINING PROGRAM

## 2022-08-10 PROCEDURE — 3074F PR MOST RECENT SYSTOLIC BLOOD PRESSURE < 130 MM HG: ICD-10-PCS | Mod: CPTII,S$GLB,, | Performed by: STUDENT IN AN ORGANIZED HEALTH CARE EDUCATION/TRAINING PROGRAM

## 2022-08-10 PROCEDURE — 99213 OFFICE O/P EST LOW 20 MIN: CPT | Mod: S$GLB,,, | Performed by: STUDENT IN AN ORGANIZED HEALTH CARE EDUCATION/TRAINING PROGRAM

## 2022-08-10 PROCEDURE — 3008F PR BODY MASS INDEX (BMI) DOCUMENTED: ICD-10-PCS | Mod: CPTII,S$GLB,, | Performed by: STUDENT IN AN ORGANIZED HEALTH CARE EDUCATION/TRAINING PROGRAM

## 2022-08-10 PROCEDURE — 3078F PR MOST RECENT DIASTOLIC BLOOD PRESSURE < 80 MM HG: ICD-10-PCS | Mod: CPTII,S$GLB,, | Performed by: STUDENT IN AN ORGANIZED HEALTH CARE EDUCATION/TRAINING PROGRAM

## 2022-08-10 PROCEDURE — 99213 PR OFFICE/OUTPT VISIT, EST, LEVL III, 20-29 MIN: ICD-10-PCS | Mod: S$GLB,,, | Performed by: STUDENT IN AN ORGANIZED HEALTH CARE EDUCATION/TRAINING PROGRAM

## 2022-08-10 PROCEDURE — 99999 PR PBB SHADOW E&M-EST. PATIENT-LVL II: ICD-10-PCS | Mod: PBBFAC,,, | Performed by: STUDENT IN AN ORGANIZED HEALTH CARE EDUCATION/TRAINING PROGRAM

## 2022-08-10 PROCEDURE — 3074F SYST BP LT 130 MM HG: CPT | Mod: CPTII,S$GLB,, | Performed by: STUDENT IN AN ORGANIZED HEALTH CARE EDUCATION/TRAINING PROGRAM

## 2022-08-10 RX ORDER — MIRTAZAPINE 15 MG/1
15 TABLET, FILM COATED ORAL NIGHTLY
Qty: 30 TABLET | Refills: 1 | Status: SHIPPED | OUTPATIENT
Start: 2022-08-10 | End: 2022-10-09

## 2022-08-10 RX ORDER — BUPROPION HYDROCHLORIDE 300 MG/1
300 TABLET ORAL DAILY
Qty: 30 TABLET | Refills: 1 | Status: SHIPPED | OUTPATIENT
Start: 2022-08-10 | End: 2022-10-09

## 2022-08-10 NOTE — PROGRESS NOTES
OUTPATIENT PSYCHIATRY FOLLOW-UP VISIT    ENCOUNTER DATE:  8/10/2022  SITE:  [facility]      CHIEF COMPLAINT:   Depression    HISTORY OF PRESENTING ILLNESS:   Sarahy Moreira is a 26 y.o. female with history of PTSD who presents for follow up.       History as told by Patient - & or family/friend/spouse/caregiver with pts permission  Doing well with sleep using Remeron. Having some difficulty with waking up in the morning but this was a struggle before using Remeron. Mood over the past couple of weeks has been good. No recent drug use. Intermittent passive SI but no intent nor plan to act on this. Work is not stressful at this time. Has not taken Buspar in past week and reports that her anxiety and mood have been better. Agreeable to consolidating medication regimen to just Wellbutrin and Remeron. Therapy with LCSW here is going well.     No new medications from other physicians. No adverse effects noted from current medications.       PSYCHIATRIC REVIEW OF SYSTEMS:(none/ yes- better/worse/stable/& what symptoms)    Symptoms of Depression:  Still present, mostly low energy and anhedonia    Symptoms of Anxiety/ panic attacks: minor improvement    Symptoms of Emely/Hypomania:  none    Symptoms of psychosis:  none    Sleep:  improved    Appetite:  none    Alcohol:  1x per week    Illicit Drugs: Recreationally only. Denies recent cocaine use.     Psychosocial stressors:  Work, relationships    Risk Parameters:  Patient reports no suicidal ideation  Patient reports no homicidal ideation  Patient reports no self-injurious behavior  Patient reports no violent behavior    PSYCHIATRIC MED REVIEW  Nortriptyline  Lexapro 10mg not efficacious and caused mood swings    Current psych meds  Medication side effects:  none  Medication compliance:  yes    Previous psych meds trials  Lexapro, gabapentin    MEDICAL REVIEW OF SYSTEMS:  Complete review of systems performed covering Constitutional,  Cardiovascular, Respiratory, Gastrointestinal, Musculoskeletal, Skin, Neurologic and Endocrine  All systems negative/ except for  Headaches.     MEDICAL HISTORY:  No past medical history on file.    ALL MEDICATIONS:    Current Outpatient Medications:     buPROPion (WELLBUTRIN XL) 300 MG 24 hr tablet, Take 1 tablet (300 mg total) by mouth once daily., Disp: 30 tablet, Rfl: 0    busPIRone (BUSPAR) 10 MG tablet, Take 1 tablet (10 mg total) by mouth 3 (three) times daily., Disp: 90 tablet, Rfl: 0    busPIRone (BUSPAR) 10 MG tablet, Take 1 tablet (10 mg total) by mouth 3 (three) times daily., Disp: 90 tablet, Rfl: 0    gabapentin (NEURONTIN) 300 MG capsule, Take 2 capsules (600 mg total) by mouth 3 (three) times daily., Disp: 180 capsule, Rfl: 11    hydrOXYzine HCL (ATARAX) 25 MG tablet, Take 1-2 tablets (25-50 mg total) by mouth nightly as needed (sleep)., Disp: 60 tablet, Rfl: 0    mirtazapine (REMERON) 15 MG tablet, Take 1 tablet (15 mg total) by mouth every evening., Disp: 30 tablet, Rfl: 0    norethindrone (MICRONOR) 0.35 mg tablet, , Disp: , Rfl:     QULIPTA 60 mg Tab, TAKE 60 MG BY MOUTH ONCE DAILY., Disp: 30 tablet, Rfl: 5    traZODone (DESYREL) 100 MG tablet, Take 0.5-1 tablets ( mg total) by mouth nightly as needed for Insomnia., Disp: 30 tablet, Rfl: 0    ALLERGIES:  Review of patient's allergies indicates:  No Known Allergies    RELEVANT LABS/STUDIES:    No results found for: WBC, HGB, HCT, MCV, PLT  BMP  No results found for: NA, K, CL, CO2, BUN, CREATININE, CALCIUM, ANIONGAP, ESTGFRAFRICA, EGFRNONAA  No results found for: ALT, AST, GGT, ALKPHOS, BILITOT  No results found for: TSH  No results found for: LABA1C, HGBA1C      VITALS  /87  HR 57      PHYSICAL EXAM  General: well developed, well nourished  Neurologic:   Gait: Normal   Psychomotor signs:  No involuntary movements or tremor  AIMS: none    PSYCHIATRIC EXAM:    Mental Status Exam:  Appearance: unremarkable, age  "appropriate  Behavior/Cooperation: limited/ appropriate normal, cooperative. Appropriately laughing during interview  Speech:  normal tone, normal rate, normal pitch, normal volume  Language: uses words appropriately; NO aphasia or dysarthria  Mood: "good"  Affect:  Reactive, appropriate  Thought Process: normal and logical  Thought Content: normal, no suicidality, no homicidality, delusions, or paranoia  Level of Consciousness: Alert and Oriented x3  Memory:  Intact  Attention/concentration: appropriate for age/education.   Fund of Knowledge: appears adequate  Insight:  Intact  Judgment: Intact       IMPRESSION:    Sarahy Moreira is a 26 y.o. female with history of PTSD who presents for follow up. Symptoms of MDD and MONCHO.   Unclear if previously has had true hypomanic episodes or if variation of normal and/or fueled by caffeine, cannabis, cocaine use. Discussed diagnoses with pt, agrees to plan for trial of antidepressants and monitoring response and for manic symptoms, and to call if experiencing manic symptoms/episode.      DIAGNOSES:    ICD-10-CM ICD-9-CM   1. Current moderate episode of major depressive disorder, unspecified whether recurrent  F32.1 296.22   2. Generalized anxiety disorder  F41.1 300.02   3. PTSD (post-traumatic stress disorder)  F43.10 309.81   Cocaine, alcohol and cannabis use.  Rule out Bipolar 2 disorder, mre depression    PLAN:  Psych Med:     Continue wellbutrin XL 300mg .  Consider increasing on future visits if needed for anhedonia.    Discontinue Buspar   Continue Remeron 15mg nightly. Can increase further in the future if effective for mood.    Continue psychotherapy     Discussed with patient informed consent, risks vs. benefits, alternative treatments, side effect profile and the inherent unpredictability of individual responses to these treatments. Answered any questions patient may have had. The patient expresses understanding of the above and displays the capacity to agree " with this current plan     Other:      RETURN TO CLINIC: 2 month    Branden Morales MD  [facility], PGY-3  08/10/2022

## 2022-08-15 ENCOUNTER — OFFICE VISIT (OUTPATIENT)
Dept: PSYCHIATRY | Facility: CLINIC | Age: 27
End: 2022-08-15
Payer: COMMERCIAL

## 2022-08-15 DIAGNOSIS — F32.1 CURRENT MODERATE EPISODE OF MAJOR DEPRESSIVE DISORDER, UNSPECIFIED WHETHER RECURRENT: Primary | ICD-10-CM

## 2022-08-15 DIAGNOSIS — F41.1 GENERALIZED ANXIETY DISORDER: ICD-10-CM

## 2022-08-15 PROCEDURE — 90834 PR PSYCHOTHERAPY W/PATIENT, 45 MIN: ICD-10-PCS | Mod: S$GLB,,, | Performed by: SOCIAL WORKER

## 2022-08-15 PROCEDURE — 90834 PSYTX W PT 45 MINUTES: CPT | Mod: S$GLB,,, | Performed by: SOCIAL WORKER

## 2022-08-15 NOTE — PROGRESS NOTES
Individual Psychotherapy (PhD/LCSW)    8/15/2022       Site:  [facility]         Therapeutic Intervention: Met with patient.  Outpatient - Insight oriented psychotherapy 45 min - CPT code 11763, Outpatient - Behavior modifying psychotherapy 45 min - CPT code 12155 and Outpatient - Supportive psychotherapy 45 min - CPT Code 04757    Chief complaint/reason for encounter: depression     Interval history and content of current session: Pt is a 26 year old single white female who presents today for a follow up therapy session. Pt alert and oriented this afternoon. Pt states that she has felt less anxious this past week due to being extremely busy with work. Pt reports that she is looking forward to working at the new location and was promoted to  within the first week of work. Pt reports that this increased her self-esteem and provided validation. 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 Pt still endorses passive SI. Pt states that the thoughts come into her head throughout the day, however pt was able to contract for safety and states that loved ones are a protective factor. Pt states that if she did have active suicidal thoughts with a plan, she would contact her mother, her friend Melissa maria guadalupe in [place], or her friend Huan in [place]. Pt and clinician reviewed abnormal worry vs worry questions in the MONCHO Workbook. Pt and clinician reviewed Session 1. Pt plans on monitoring her mood through the worry record (made 10 copies for patient) before next session.       Treatment plan:  · Target symptoms: depression, anxiety , work stress  · Why chosen therapy is appropriate versus another modality: relevant to diagnosis, patient responds to this modality, evidence based practice  · Outcome monitoring methods: self-report, observation  · Therapeutic intervention type: insight oriented psychotherapy, behavior modifying  psychotherapy, supportive psychotherapy    Risk parameters:  Patient reports suicidal ideation: passive SI, denies active plan  Patient reports no homicidal ideation  Patient reports no self-injurious behavior  Patient reports no violent behavior    Verbal deficits: None    Patient's response to intervention:  The patient's response to intervention is accepting.    Progress toward goals and other mental status changes:  The patient's progress toward goals is excellent.    Diagnosis:     ICD-10-CM ICD-9-CM   1. Current moderate episode of major depressive disorder, unspecified whether recurrent  F32.1 296.22   2. Generalized anxiety disorder  F41.1 300.02       Plan:  individual psychotherapy and medication management by physician       Return to clinic: 1 week    Length of Service (minutes): 45

## 2022-08-29 ENCOUNTER — OFFICE VISIT (OUTPATIENT)
Dept: PSYCHIATRY | Facility: CLINIC | Age: 27
End: 2022-08-29

## 2022-08-29 DIAGNOSIS — F32.1 CURRENT MODERATE EPISODE OF MAJOR DEPRESSIVE DISORDER, UNSPECIFIED WHETHER RECURRENT: Primary | ICD-10-CM

## 2022-08-29 DIAGNOSIS — F41.1 GENERALIZED ANXIETY DISORDER: ICD-10-CM

## 2022-08-29 DIAGNOSIS — F43.10 PTSD (POST-TRAUMATIC STRESS DISORDER): ICD-10-CM

## 2022-08-29 PROCEDURE — 90834 PR PSYCHOTHERAPY W/PATIENT, 45 MIN: ICD-10-PCS | Mod: ,,, | Performed by: SOCIAL WORKER

## 2022-08-29 PROCEDURE — 90834 PSYTX W PT 45 MINUTES: CPT | Mod: ,,, | Performed by: SOCIAL WORKER
